# Patient Record
Sex: FEMALE | Race: WHITE | HISPANIC OR LATINO
[De-identification: names, ages, dates, MRNs, and addresses within clinical notes are randomized per-mention and may not be internally consistent; named-entity substitution may affect disease eponyms.]

---

## 2017-01-24 ENCOUNTER — APPOINTMENT (OUTPATIENT)
Dept: DERMATOLOGY | Facility: CLINIC | Age: 47
End: 2017-01-24

## 2018-04-05 ENCOUNTER — APPOINTMENT (OUTPATIENT)
Dept: PULMONOLOGY | Facility: CLINIC | Age: 48
End: 2018-04-05
Payer: COMMERCIAL

## 2018-04-05 VITALS
OXYGEN SATURATION: 98 % | SYSTOLIC BLOOD PRESSURE: 130 MMHG | HEIGHT: 65 IN | HEART RATE: 75 BPM | TEMPERATURE: 98.6 F | BODY MASS INDEX: 38.15 KG/M2 | DIASTOLIC BLOOD PRESSURE: 80 MMHG | RESPIRATION RATE: 18 BRPM | WEIGHT: 229 LBS

## 2018-04-05 DIAGNOSIS — K21.9 GASTRO-ESOPHAGEAL REFLUX DISEASE W/OUT ESOPHAGITIS: ICD-10-CM

## 2018-04-05 DIAGNOSIS — Z87.891 PERSONAL HISTORY OF NICOTINE DEPENDENCE: ICD-10-CM

## 2018-04-05 DIAGNOSIS — R40.0 SOMNOLENCE: ICD-10-CM

## 2018-04-05 DIAGNOSIS — Z80.3 FAMILY HISTORY OF MALIGNANT NEOPLASM OF BREAST: ICD-10-CM

## 2018-04-05 PROCEDURE — 99203 OFFICE O/P NEW LOW 30 MIN: CPT

## 2018-04-19 ENCOUNTER — FORM ENCOUNTER (OUTPATIENT)
Age: 48
End: 2018-04-19

## 2018-04-19 ENCOUNTER — APPOINTMENT (OUTPATIENT)
Dept: OTOLARYNGOLOGY | Facility: CLINIC | Age: 48
End: 2018-04-19
Payer: COMMERCIAL

## 2018-04-19 VITALS
DIASTOLIC BLOOD PRESSURE: 90 MMHG | OXYGEN SATURATION: 99 % | SYSTOLIC BLOOD PRESSURE: 151 MMHG | HEART RATE: 70 BPM | TEMPERATURE: 98.4 F

## 2018-04-19 PROCEDURE — 99204 OFFICE O/P NEW MOD 45 MIN: CPT

## 2018-04-20 RX ORDER — AMLODIPINE BESYLATE 10 MG/1
10 TABLET ORAL
Qty: 90 | Refills: 0 | Status: ACTIVE | COMMUNITY
Start: 2018-04-11

## 2018-04-27 ENCOUNTER — APPOINTMENT (OUTPATIENT)
Dept: OTOLARYNGOLOGY | Facility: CLINIC | Age: 48
End: 2018-04-27
Payer: COMMERCIAL

## 2018-04-27 VITALS
DIASTOLIC BLOOD PRESSURE: 89 MMHG | HEART RATE: 71 BPM | TEMPERATURE: 97 F | OXYGEN SATURATION: 99 % | SYSTOLIC BLOOD PRESSURE: 139 MMHG

## 2018-04-27 DIAGNOSIS — R42 DIZZINESS AND GIDDINESS: ICD-10-CM

## 2018-04-27 DIAGNOSIS — H90.42 SENSORINEURAL HEARING LOSS, UNILATERAL, LEFT EAR, WITH UNRESTRICTED HEARING ON THE CONTRALATERAL SIDE: ICD-10-CM

## 2018-04-27 PROCEDURE — 92567 TYMPANOMETRY: CPT

## 2018-04-27 PROCEDURE — 92537 CALORIC VSTBLR TEST W/REC: CPT

## 2018-04-27 PROCEDURE — 92540 BASIC VESTIBULAR EVALUATION: CPT

## 2018-04-27 PROCEDURE — 92586: CPT

## 2018-04-27 PROCEDURE — 92557 COMPREHENSIVE HEARING TEST: CPT

## 2018-04-27 PROCEDURE — 99213 OFFICE O/P EST LOW 20 MIN: CPT

## 2018-05-17 ENCOUNTER — FORM ENCOUNTER (OUTPATIENT)
Age: 48
End: 2018-05-17

## 2018-05-30 ENCOUNTER — APPOINTMENT (OUTPATIENT)
Dept: OTOLARYNGOLOGY | Facility: CLINIC | Age: 48
End: 2018-05-30

## 2018-09-04 PROBLEM — M25.562 CHRONIC PAIN OF LEFT KNEE: Status: ACTIVE | Noted: 2018-09-04

## 2018-09-05 ENCOUNTER — APPOINTMENT (OUTPATIENT)
Dept: ORTHOPEDIC SURGERY | Facility: CLINIC | Age: 48
End: 2018-09-05

## 2018-09-05 DIAGNOSIS — G89.29 PAIN IN LEFT KNEE: ICD-10-CM

## 2018-09-05 DIAGNOSIS — M25.562 PAIN IN LEFT KNEE: ICD-10-CM

## 2018-09-06 ENCOUNTER — EMERGENCY (EMERGENCY)
Facility: HOSPITAL | Age: 48
LOS: 1 days | Discharge: HOME | End: 2018-09-06
Attending: EMERGENCY MEDICINE | Admitting: STUDENT IN AN ORGANIZED HEALTH CARE EDUCATION/TRAINING PROGRAM
Payer: COMMERCIAL

## 2018-09-06 VITALS
DIASTOLIC BLOOD PRESSURE: 102 MMHG | HEART RATE: 71 BPM | TEMPERATURE: 98 F | RESPIRATION RATE: 18 BRPM | OXYGEN SATURATION: 100 % | SYSTOLIC BLOOD PRESSURE: 188 MMHG

## 2018-09-06 LAB
APTT BLD: 33.2 SEC — SIGNIFICANT CHANGE UP (ref 27.5–37.4)
BASE EXCESS BLDV CALC-SCNC: 3.2 MMOL/L — SIGNIFICANT CHANGE UP
BASOPHILS # BLD AUTO: 0.04 K/UL — SIGNIFICANT CHANGE UP (ref 0–0.2)
BASOPHILS NFR BLD AUTO: 0.4 % — SIGNIFICANT CHANGE UP (ref 0–2)
BLOOD GAS VENOUS - CREATININE: 0.58 MG/DL — SIGNIFICANT CHANGE UP (ref 0.5–1.3)
CHLORIDE BLDV-SCNC: 103 MMOL/L — SIGNIFICANT CHANGE UP (ref 96–108)
EOSINOPHIL # BLD AUTO: 0.19 K/UL — SIGNIFICANT CHANGE UP (ref 0–0.5)
EOSINOPHIL NFR BLD AUTO: 2.1 % — SIGNIFICANT CHANGE UP (ref 0–6)
GAS PNL BLDV: 131 MMOL/L — LOW (ref 136–146)
GLUCOSE BLDV-MCNC: 101 — HIGH (ref 70–99)
HCO3 BLDV-SCNC: 26 MMOL/L — SIGNIFICANT CHANGE UP (ref 20–27)
HCT VFR BLD CALC: 42.9 % — SIGNIFICANT CHANGE UP (ref 34.5–45)
HCT VFR BLDV CALC: 45.1 % — HIGH (ref 34.5–45)
HGB BLD-MCNC: 14.1 G/DL — SIGNIFICANT CHANGE UP (ref 11.5–15.5)
HGB BLDV-MCNC: 14.7 G/DL — SIGNIFICANT CHANGE UP (ref 11.5–15.5)
IMM GRANULOCYTES # BLD AUTO: 0.02 # — SIGNIFICANT CHANGE UP
IMM GRANULOCYTES NFR BLD AUTO: 0.2 % — SIGNIFICANT CHANGE UP (ref 0–1.5)
INR BLD: 1.02 — SIGNIFICANT CHANGE UP (ref 0.88–1.17)
LACTATE BLDV-MCNC: 1.4 MMOL/L — SIGNIFICANT CHANGE UP (ref 0.5–2)
LYMPHOCYTES # BLD AUTO: 2.71 K/UL — SIGNIFICANT CHANGE UP (ref 1–3.3)
LYMPHOCYTES # BLD AUTO: 29.5 % — SIGNIFICANT CHANGE UP (ref 13–44)
MCHC RBC-ENTMCNC: 26.5 PG — LOW (ref 27–34)
MCHC RBC-ENTMCNC: 32.9 % — SIGNIFICANT CHANGE UP (ref 32–36)
MCV RBC AUTO: 80.5 FL — SIGNIFICANT CHANGE UP (ref 80–100)
MONOCYTES # BLD AUTO: 0.88 K/UL — SIGNIFICANT CHANGE UP (ref 0–0.9)
MONOCYTES NFR BLD AUTO: 9.6 % — SIGNIFICANT CHANGE UP (ref 2–14)
NEUTROPHILS # BLD AUTO: 5.34 K/UL — SIGNIFICANT CHANGE UP (ref 1.8–7.4)
NEUTROPHILS NFR BLD AUTO: 58.2 % — SIGNIFICANT CHANGE UP (ref 43–77)
NRBC # FLD: 0 — SIGNIFICANT CHANGE UP
PCO2 BLDV: 46 MMHG — SIGNIFICANT CHANGE UP (ref 41–51)
PH BLDV: 7.4 PH — SIGNIFICANT CHANGE UP (ref 7.32–7.43)
PLATELET # BLD AUTO: 426 K/UL — HIGH (ref 150–400)
PMV BLD: 10.3 FL — SIGNIFICANT CHANGE UP (ref 7–13)
PO2 BLDV: 40 MMHG — SIGNIFICANT CHANGE UP (ref 35–40)
POTASSIUM BLDV-SCNC: 4.8 MMOL/L — HIGH (ref 3.4–4.5)
PROTHROM AB SERPL-ACNC: 11.3 SEC — SIGNIFICANT CHANGE UP (ref 9.8–13.1)
RBC # BLD: 5.33 M/UL — HIGH (ref 3.8–5.2)
RBC # FLD: 13.1 % — SIGNIFICANT CHANGE UP (ref 10.3–14.5)
SAO2 % BLDV: 70.9 % — SIGNIFICANT CHANGE UP (ref 60–85)
WBC # BLD: 9.18 K/UL — SIGNIFICANT CHANGE UP (ref 3.8–10.5)
WBC # FLD AUTO: 9.18 K/UL — SIGNIFICANT CHANGE UP (ref 3.8–10.5)

## 2018-09-06 PROCEDURE — 99291 CRITICAL CARE FIRST HOUR: CPT

## 2018-09-06 RX ORDER — ASPIRIN/CALCIUM CARB/MAGNESIUM 324 MG
81 TABLET ORAL ONCE
Qty: 0 | Refills: 0 | Status: COMPLETED | OUTPATIENT
Start: 2018-09-06 | End: 2018-09-06

## 2018-09-06 NOTE — ED PROVIDER NOTE - ATTENDING CONTRIBUTION TO CARE
Dr. Salgado: I have personally performed a face to face bedside history and physical examination of this patient. I have discussed the history, examination, review of systems, assessment and plan of management with the resident. I have reviewed the electronic medical record and amended it to reflect my history, review of systems, physical exam, assessment and plan.    pt with sudden onset of chest discomfort/palpitations then L arm numbness and L face numbness. no dysarthria, sob, vision changes, headache, vomiting. No prior similar episodes. code stroke activated on arrival as within TPA window    on exam well appearing  RRR s 1 s2  A&Ox3, PERRLA, EOMI, no nystagmus, CN2-12 grossly intact, except decreased sensation to right lower face, no pronator drift, normal finger to nose and rapid alternating finger movements,  5/5 strength in all extremities, normal gait, symmetric patellar reflexes. reports decreased sensation to left arm.    concerning for stroke vs tia, r/o brain mass, electrolyte  abnormality, dysrythmia, ACS. Plan for EKG, CT head, labs.

## 2018-09-06 NOTE — ED PROVIDER NOTE - PHYSICAL EXAMINATION
Gen: No acute distress, alert, cooperative  Head: Normocephalic, Atraumatic  HEENT: PERRL, oral mucosa moist, normal conjunctiva  Lung: CTAB, no respiratory distress, no crackles or wheezes  CV: rrr, no murmur  Abd: soft, NTND, no rebound or guarding  MSK: No LE edema, no visible deformities  Neuro: numbness to left face and left arm, normal strength all extremities, no pronator drift, finger to nose normal, speech clear  Skin: Warm and dry, no evidence of rash   Psych: normal affect, follows commands

## 2018-09-06 NOTE — ED ADULT TRIAGE NOTE - CHIEF COMPLAINT QUOTE
Pt c/o palpitations since 9:30pm while watching TV with left arm and face numbness and tingling, dizziness. Denies any pain/discomfort, SOB. Speech clear, no facial droop noted. +strength, +sensation. FS 93. LYRIC called for stroke eval. Code stroke called. Charge RN notified and aware.

## 2018-09-06 NOTE — ED PROVIDER NOTE - MEDICAL DECISION MAKING DETAILS
49yo with htn presenting with 1.5 hours left sided facial and left arm numbness, no vision changes, no speech changes, no facial droop, no weakness starting at 9:30pm acutely. No other symptoms. Code stroke called. CT completed. labs, neuro recs.

## 2018-09-06 NOTE — ED PROVIDER NOTE - PROGRESS NOTE DETAILS
[place] Stroke eval: 47yo with htn with 1.5 hours left sided facial and left arm numbness, no vision changes, no speech changes, no facial droop, no weakness. On exam + sensory deficit left face and left arm, strength intact, no pronator drift, code stroke called and d/w neuro resident Damian: neuro would like cta head/neck. AK: Neuro talked to hospitalist. Accepted for admission.

## 2018-09-06 NOTE — ED PROVIDER NOTE - OBJECTIVE STATEMENT
47yo with htn presenting with 1.5 hours left sided facial and left arm numbness, no vision changes, no speech changes, no facial droop, no weakness starting at 9:30pm acutely. No other symptoms. Her blood pressure was elevated at home.

## 2018-09-07 ENCOUNTER — TRANSCRIPTION ENCOUNTER (OUTPATIENT)
Age: 48
End: 2018-09-07

## 2018-09-07 VITALS
OXYGEN SATURATION: 100 % | HEART RATE: 69 BPM | RESPIRATION RATE: 17 BRPM | TEMPERATURE: 98 F | DIASTOLIC BLOOD PRESSURE: 82 MMHG | SYSTOLIC BLOOD PRESSURE: 127 MMHG

## 2018-09-07 DIAGNOSIS — R29.818 OTHER SYMPTOMS AND SIGNS INVOLVING THE NERVOUS SYSTEM: ICD-10-CM

## 2018-09-07 DIAGNOSIS — Z98.890 OTHER SPECIFIED POSTPROCEDURAL STATES: Chronic | ICD-10-CM

## 2018-09-07 DIAGNOSIS — Z29.9 ENCOUNTER FOR PROPHYLACTIC MEASURES, UNSPECIFIED: ICD-10-CM

## 2018-09-07 DIAGNOSIS — I10 ESSENTIAL (PRIMARY) HYPERTENSION: ICD-10-CM

## 2018-09-07 DIAGNOSIS — I63.9 CEREBRAL INFARCTION, UNSPECIFIED: ICD-10-CM

## 2018-09-07 LAB
ALBUMIN SERPL ELPH-MCNC: 4.3 G/DL — SIGNIFICANT CHANGE UP (ref 3.3–5)
ALP SERPL-CCNC: 68 U/L — SIGNIFICANT CHANGE UP (ref 40–120)
ALT FLD-CCNC: 14 U/L — SIGNIFICANT CHANGE UP (ref 4–33)
AST SERPL-CCNC: 20 U/L — SIGNIFICANT CHANGE UP (ref 4–32)
BILIRUB SERPL-MCNC: 0.3 MG/DL — SIGNIFICANT CHANGE UP (ref 0.2–1.2)
BUN SERPL-MCNC: 12 MG/DL — SIGNIFICANT CHANGE UP (ref 7–23)
BUN SERPL-MCNC: 8 MG/DL — SIGNIFICANT CHANGE UP (ref 7–23)
CALCIUM SERPL-MCNC: 9 MG/DL — SIGNIFICANT CHANGE UP (ref 8.4–10.5)
CALCIUM SERPL-MCNC: 9.6 MG/DL — SIGNIFICANT CHANGE UP (ref 8.4–10.5)
CHLORIDE SERPL-SCNC: 100 MMOL/L — SIGNIFICANT CHANGE UP (ref 98–107)
CHLORIDE SERPL-SCNC: 97 MMOL/L — LOW (ref 98–107)
CHOLEST SERPL-MCNC: 123 MG/DL — SIGNIFICANT CHANGE UP (ref 120–199)
CO2 SERPL-SCNC: 23 MMOL/L — SIGNIFICANT CHANGE UP (ref 22–31)
CO2 SERPL-SCNC: 23 MMOL/L — SIGNIFICANT CHANGE UP (ref 22–31)
CREAT SERPL-MCNC: 0.7 MG/DL — SIGNIFICANT CHANGE UP (ref 0.5–1.3)
CREAT SERPL-MCNC: 0.74 MG/DL — SIGNIFICANT CHANGE UP (ref 0.5–1.3)
GLUCOSE SERPL-MCNC: 92 MG/DL — SIGNIFICANT CHANGE UP (ref 70–99)
GLUCOSE SERPL-MCNC: 97 MG/DL — SIGNIFICANT CHANGE UP (ref 70–99)
HBA1C BLD-MCNC: 5.1 % — SIGNIFICANT CHANGE UP (ref 4–5.6)
HCT VFR BLD CALC: 39 % — SIGNIFICANT CHANGE UP (ref 34.5–45)
HDLC SERPL-MCNC: 55 MG/DL — SIGNIFICANT CHANGE UP (ref 45–65)
HGB BLD-MCNC: 12.9 G/DL — SIGNIFICANT CHANGE UP (ref 11.5–15.5)
LIPID PNL WITH DIRECT LDL SERPL: 64 MG/DL — SIGNIFICANT CHANGE UP
MAGNESIUM SERPL-MCNC: 1.9 MG/DL — SIGNIFICANT CHANGE UP (ref 1.6–2.6)
MCHC RBC-ENTMCNC: 27 PG — SIGNIFICANT CHANGE UP (ref 27–34)
MCHC RBC-ENTMCNC: 33.1 % — SIGNIFICANT CHANGE UP (ref 32–36)
MCV RBC AUTO: 81.6 FL — SIGNIFICANT CHANGE UP (ref 80–100)
NRBC # FLD: 0 — SIGNIFICANT CHANGE UP
PHOSPHATE SERPL-MCNC: 3 MG/DL — SIGNIFICANT CHANGE UP (ref 2.5–4.5)
PLATELET # BLD AUTO: 368 K/UL — SIGNIFICANT CHANGE UP (ref 150–400)
PMV BLD: 10.2 FL — SIGNIFICANT CHANGE UP (ref 7–13)
POTASSIUM SERPL-MCNC: 3.8 MMOL/L — SIGNIFICANT CHANGE UP (ref 3.5–5.3)
POTASSIUM SERPL-MCNC: 4.2 MMOL/L — SIGNIFICANT CHANGE UP (ref 3.5–5.3)
POTASSIUM SERPL-SCNC: 3.8 MMOL/L — SIGNIFICANT CHANGE UP (ref 3.5–5.3)
POTASSIUM SERPL-SCNC: 4.2 MMOL/L — SIGNIFICANT CHANGE UP (ref 3.5–5.3)
PROT SERPL-MCNC: 8.6 G/DL — HIGH (ref 6–8.3)
RBC # BLD: 4.78 M/UL — SIGNIFICANT CHANGE UP (ref 3.8–5.2)
RBC # FLD: 13.1 % — SIGNIFICANT CHANGE UP (ref 10.3–14.5)
SODIUM SERPL-SCNC: 137 MMOL/L — SIGNIFICANT CHANGE UP (ref 135–145)
SODIUM SERPL-SCNC: 138 MMOL/L — SIGNIFICANT CHANGE UP (ref 135–145)
TRIGL SERPL-MCNC: 48 MG/DL — SIGNIFICANT CHANGE UP (ref 10–149)
TROPONIN T, HIGH SENSITIVITY: < 6 NG/L — SIGNIFICANT CHANGE UP (ref ?–14)
TSH SERPL-MCNC: 3.12 UIU/ML — SIGNIFICANT CHANGE UP (ref 0.27–4.2)
WBC # BLD: 7.83 K/UL — SIGNIFICANT CHANGE UP (ref 3.8–10.5)
WBC # FLD AUTO: 7.83 K/UL — SIGNIFICANT CHANGE UP (ref 3.8–10.5)

## 2018-09-07 RX ORDER — HEPARIN SODIUM 5000 [USP'U]/ML
5000 INJECTION INTRAVENOUS; SUBCUTANEOUS EVERY 12 HOURS
Qty: 0 | Refills: 0 | Status: DISCONTINUED | OUTPATIENT
Start: 2018-09-07 | End: 2018-09-07

## 2018-09-07 RX ORDER — AMLODIPINE BESYLATE 2.5 MG/1
1 TABLET ORAL
Qty: 7 | Refills: 0 | OUTPATIENT
Start: 2018-09-07 | End: 2018-09-13

## 2018-09-07 RX ORDER — ASPIRIN/CALCIUM CARB/MAGNESIUM 324 MG
81 TABLET ORAL DAILY
Qty: 0 | Refills: 0 | Status: DISCONTINUED | OUTPATIENT
Start: 2018-09-07 | End: 2018-09-07

## 2018-09-07 RX ORDER — PANTOPRAZOLE SODIUM 20 MG/1
40 TABLET, DELAYED RELEASE ORAL AT BEDTIME
Qty: 0 | Refills: 0 | Status: DISCONTINUED | OUTPATIENT
Start: 2018-09-07 | End: 2018-09-07

## 2018-09-07 RX ORDER — ASPIRIN/CALCIUM CARB/MAGNESIUM 324 MG
1 TABLET ORAL
Qty: 30 | Refills: 0 | OUTPATIENT
Start: 2018-09-07 | End: 2018-10-06

## 2018-09-07 RX ORDER — ATORVASTATIN CALCIUM 80 MG/1
80 TABLET, FILM COATED ORAL AT BEDTIME
Qty: 0 | Refills: 0 | Status: DISCONTINUED | OUTPATIENT
Start: 2018-09-07 | End: 2018-09-07

## 2018-09-07 RX ORDER — INFLUENZA VIRUS VACCINE 15; 15; 15; 15 UG/.5ML; UG/.5ML; UG/.5ML; UG/.5ML
0.5 SUSPENSION INTRAMUSCULAR ONCE
Qty: 0 | Refills: 0 | Status: DISCONTINUED | OUTPATIENT
Start: 2018-09-07 | End: 2018-09-07

## 2018-09-07 RX ADMIN — HEPARIN SODIUM 5000 UNIT(S): 5000 INJECTION INTRAVENOUS; SUBCUTANEOUS at 07:27

## 2018-09-07 RX ADMIN — Medication 81 MILLIGRAM(S): at 11:13

## 2018-09-07 RX ADMIN — Medication 81 MILLIGRAM(S): at 00:52

## 2018-09-07 NOTE — PROGRESS NOTE ADULT - ASSESSMENT
46 y/o F with h/o HTN, legally blind in right eye, scoliosis presents to the ED for left facial and left arm numbness and tingling admitted to telemetry to r/o possible stroke. Presenting symptoms now resolved, exam benign, blood work WNL, and MRI negative for acute stroke.

## 2018-09-07 NOTE — DISCHARGE NOTE ADULT - HOSPITAL COURSE
49 y/o F with h/o HTN, legally blind in right eye, scoliosis presents to the ED for left faci and left arm numbness and tingling. Admit to telemetry to r/o CVA. 47 y/o F with h/o HTN, legally blind in right eye, scoliosis presents to the ED for left faci and left arm numbness and tingling. Admit to telemetry to r/o CVA.     Neurology F/U, TELE  monitor, CT Angio Head & Neck pending Results, MRI Head no contrast- neg for acute bleed.

## 2018-09-07 NOTE — ED ADULT NURSE NOTE - OBJECTIVE STATEMENT
[place] Nurse Note- pt presents to ED as a code stroke. Pt developed left sided paresthesias at 2130 hrs while at rest, watching tv. no syncope. Hx of htn. Compliant with amlodipine 5mg. symmetrical smile. good  strength bilat. no arm drift. bilat radial pulses present. Clear speech. Pt is A&Ox4. rsr on monitor, no ectopy. respirations even and unlabored. Pt denies pain at this time. iv placed, labs drawn and sent. 18 ga right ac. Pt endorsed to primary RN Shannon

## 2018-09-07 NOTE — DISCHARGE NOTE ADULT - ADDITIONAL INSTRUCTIONS
Return to the Hospital Emergency Department for any worsening symptoms or concerns, headache, nausea or vomiting, blurry vision, changes in mental status, extremities weakness, numbness, tingling, or any difficulties at all.  Please follow up with your own private physician or our medical clinic at [phone] with in one week.   Find a doctor at [phone].  Thank you for allowing us to take part in your care. You can start taking a baby Aspirin once daily and you need to follow up with your doctor to schedule an outpatient Echo.  Return to the Hospital Emergency Department for any worsening symptoms or concerns, headache, nausea or vomiting, blurry vision, changes in mental status, extremities weakness, numbness, tingling, or any difficulties at all.  Please follow up with your own private physician or our medical clinic at [phone] with in one week.   Find a doctor at [phone].  Thank you for allowing us to take part in your care.

## 2018-09-07 NOTE — ED ADULT NURSE REASSESSMENT NOTE - NS ED NURSE REASSESS COMMENT FT1
No acute distress at present. Respirations are even and unlabored on room air. Pt. is admitted to Tele, awaiting bed assignment. Report given to ESSU 1 PARDEEP Gillis. Pt. is at CT at present and will be transported to ESSU 1 upon CT completion.

## 2018-09-07 NOTE — PROGRESS NOTE ADULT - PROBLEM SELECTOR PLAN 1
Stroke r/o by MRI. CTA of head and neck revealed patent vessels. Lipid panel normal. Low suspicion for TIA at this time. Pt only with HTN but reports BP is usually well controlled on home regimen of amlodipine. Etiology of presenting symptoms still unknown. Symptoms now resolved spontaneously  TSH normal. HbA1c normal.  - f/u neurology to comment on MRI findings  - d/c atorvastatin

## 2018-09-07 NOTE — SWALLOW BEDSIDE ASSESSMENT ADULT - ORAL PHASE
Within functional limits Lingual stasis/Lingual stasis noted and independently cleared with a liquid wash.

## 2018-09-07 NOTE — CONSULT NOTE ADULT - SUBJECTIVE AND OBJECTIVE BOX
JASMINE Schuler is a 47y old  Female who presents with a chief complaint of left sided tingling    HPI:  Pt is a 46 y/o Left handed woman with hx of HTN p/w sudden onset of palpitations and left face and arm tingling and numbness at 9:30 PM while watching TV. Pt has never experienced these symptoms before. She denies any headache, visual changes, speech disturbance, weakness or difficulty ambulating. She endorses that she tingling and sensory disturbance is still going on. At the time onset her blood pressure was elevated to the 180s which is well beyond her baseline of the 130s. Does not take aspirin.       MEDICATIONS  (STANDING):  aspirin  chewable 81 milliGRAM(s) Oral once    MEDICATIONS  (PRN): amlodipine    PAST MEDICAL & SURGICAL HISTORY: HTN , blind right eye    FAMILY HISTORY:    Allergies    No Known Allergies    Intolerances        SHx - former smoker 26yrs ago      REVIEW OF SYSTEMS:    Constitutional: No fever, weight loss, or fatigue  Eyes: No eye pain, visual disturbances, or discharge  ENMT:  No difficulty hearing, tinnitus, vertigo; No sinus or throat pain  Neck: No pain or stiffness  Respiratory: No cough, wheezing, or shortness of breath  Cardiovascular: No chest pain, palpitations, or leg swelling  Gastrointestinal: No abdominal pain, nausea, vomiting, diarrhea or constipation.   Genitourinary: No dysuria, frequency, hematuria, or incontinence  Neurological: As per HPI  Skin: No itching, burning, rashes, or lesions   Endocrine: No heat or cold intolerance; No hair loss  Musculoskeletal: No joint pain or swelling; No muscle, back, or extremity pain  Psychiatric: No depression, anxiety, mood swings, or difficulty sleeping  Heme/Lymph: No easy bruising or bleeding; No enlarged glands      Vital Signs Last 24 Hrs  T(C): 36.8 (06 Sep 2018 22:55), Max: 36.8 (06 Sep 2018 22:55)  T(F): 98.3 (06 Sep 2018 22:55), Max: 98.3 (06 Sep 2018 22:55)  HR: 71 (06 Sep 2018 22:55) (71 - 71)  BP: 188/102 (06 Sep 2018 22:55) (188/102 - 188/102)  BP(mean): --  RR: 18 (06 Sep 2018 22:55) (18 - 18)  SpO2: 100% (06 Sep 2018 22:55) (100% - 100%)    General Exam:   General appearance: No acute distress    Cardiac:  Pulm:                 Neurological Exam:  Mental Status: Orientated to self, date and place.  Attention intact.  No dysarthria. Speech fluent.  Cranial Nerves:   Right pupil enlarged unreactive (congenital) left 2-3mm reactive, EOMI, left visual field intact, decreased right visual field, no nystagmus.   subjective decrease to LT on left side of face 60% of normal.  No facial asymmetry.  Hearing intact bilaterally.  Tongue  midline.  Sternocleidomastoid and Trapezius intact bilaterally.    Motor:   Tone: normal.                  Strength:     [] Upper extremity                      Delt       Bicep    Tricep                                                  R         5/5        5/5        5/5       5/5                                               L          5/5        5/5        5/5       5/5  [] Lower extremity                       HF          KE          KF        DF         PF                                               R        5/5        5/5        5/5       5/5       5/5                                               L         5/5        5/5       5/5       5/5        5/5             Dysmetria: None to finger-nose-finger or heel-shin-heel  No truncal ataxia.    Tremor: No resting, postural or action tremor.  No myoclonus.    Sensation: decreased to LT on left shoulder, upper chest and arm, normal to LT on legs    Gait: normal.      Other:                          14.1   9.18  )-----------( 426      ( 06 Sep 2018 23:30 )             42.9       Radiology    CT: < from: CT Brain Stroke Protocol (09.06.18 @ 23:15) >  FINDINGS:     There is no acute intra-axial or extra-axial hemorrhage. There is no mass   effect or shift of the midline. The ventricles are not dilated. There is   symmetric prominence of the bifrontal extra-axial spaces and enlargement   of the superior cerebellar cistern.. There is no CT evidence of an acute   vascular territorial infarct.
HISTORY OF PRESENT ILLNESS: HPI:  46 y/o F with h/o HTN, legally blind in the right eye, scoliosis presents to the Ed for palpitations, left face and arm tingling and numbness at 9:30 while watching tv. Pt states her symptoms have improved since arrival to the ED. Pt states she never had this before. Pt took her blood pressure at the time of her symptoms onset and he BP was systolic 180s. Pt denies LOC, fever, chills, slurred speech, facial droop, dizziness, chest pain, shortness of breath, headache, visual/hearing changes, weakness, dysuria, urinary/bowel incontinence or any other complaints at this time.     family HX: noncontributory to current pt's medical condition, (07 Sep 2018 04:06)      PAST MEDICAL & SURGICAL HISTORY:  Scoliosis  Legally blind in right eye, as defined in [place]  Hypertension  History of lumpectomy of left breast        MEDICATIONS  (STANDING):  aspirin  chewable 81 milliGRAM(s) Oral daily  atorvastatin 80 milliGRAM(s) Oral at bedtime  heparin  Injectable 5000 Unit(s) SubCutaneous every 12 hours  influenza   Vaccine 0.5 milliLiter(s) IntraMuscular once  pantoprazole    Tablet 40 milliGRAM(s) Oral at bedtime      Allergies    No Known Allergies    Intolerances        FAMILY HISTORY:  Family history of diabetes mellitus in grandfather (Grandparent)    Non-contributary for premature coronary disease or sudden cardiac death    SOCIAL HISTORY:    [ ] Non-smoker  [ ] Smoker  [ ] Alcohol      REVIEW OF SYSTEMS:  [ ]chest pain  [  ]shortness of breath  [  ]palpitations  [  ]syncope  [ ]near syncope [ ]upper extremity weakness   [ ] lower extremity weakness  [  ]diplopia  [  ]altered mental status   [  ]fevers  [ ]chills [ ]nausea  [ ]vomitting  [  ]dysphagia    [ ]abdominal pain  [ ]melena  [ ]BRBPR    [  ]epistaxis  [  ]rash    [ ]lower extremity edema        [ ] All others negative	  [ ] Unable to obtain    PHYSICAL EXAM:  T(C): 36.6 (09-07-18 @ 10:04), Max: 36.8 (09-06-18 @ 22:55)  HR: 69 (09-07-18 @ 10:04) (56 - 71)  BP: 127/82 (09-07-18 @ 10:04) (114/72 - 188/102)  RR: 17 (09-07-18 @ 10:04) (16 - 18)  SpO2: 100% (09-07-18 @ 10:04) (98% - 100%)  Wt(kg): --    Appearance: Normal	  HEENT:   Normal oral mucosa, PERRL, EOMI	  Lymphatic: No lymphadenopathy , no edema  Cardiovascular: Normal S1 S2, No JVD, No murmurs , Peripheral pulses palpable 2+ bilaterally  Respiratory: Lungs clear to auscultation, normal effort 	  Gastrointestinal:  Soft, Non-tender, + BS	  Skin: No rashes, No ecchymoses, No cyanosis, warm to touch  Musculoskeletal: Normal range of motion, normal strength  Psychiatry:  Mood & affect appropriate      TELEMETRY: 	NSR    ECG:  	NSR narrow QRS     Echo: 2016 normal LV function no valve dx  NST: stress echo 2015  Cath: n/a  	  	  LABS:	 	                          12.9   7.83  )-----------( 368      ( 07 Sep 2018 06:05 )             39.0     09-07    138  |  100  |  8   ----------------------------<  92  3.8   |  23  |  0.70    Ca    9.0      07 Sep 2018 06:05  Phos  3.0     09-07  Mg     1.9     09-07    TPro  8.6<H>  /  Alb  4.3  /  TBili  0.3  /  DBili  x   /  AST  20  /  ALT  14  /  AlkPhos  68  09-06      HgA1c: Hemoglobin A1C, Whole Blood: 5.1 % (09-07 @ 06:05)    TSH: Thyroid Stimulating Hormone, Serum: 3.12 uIU/mL (09-07 @ 06:05)     CT Brain Stroke Protocol (09.06.18 @ 23:15) >    IMPRESSION:     No acute intracranial hemorrhage, mass effect, or CT evidence of an acute   vascular territorial infarct.    < end of copied text >      CT Angio Neck w/ IV Cont (09.07.18 @ 02:27) >  IMPRESSION:  Suboptimal evaluation of the carotid bifurcations due to   artifact.    No significant stenosis or vascular occlusion identified.            < from: MR Head No Cont (09.07.18 @ 08:47) >    IMPRESSION:    No evidence of acute cerebral ischemia.    Multiple foci of white matter T2 hyperintensity which are nonspecific in   appearance. While these may be associated with migraine headaches or   microvascular disease in the appropriate clinical setting, other   possibilities including inflammatory/post inflammatory (e.g. Lyme   disease) or demyelinating etiologies should be considered.     Prominent extra-axial CSF along the frontoparietal convexities near the   vertex with mild flattening of the underlying brain. This may represent   prominent fluid within the subarachnoid space or bilateral arachnoid   cysts.    Small arachnoid cysts within the middle cranial fossa bilaterally.    Elongation of the right globe as discussed above.    < end of copied text >      ASSESSMENT/PLAN:  47 year old female well known to our practice with HTN and obesity, legally blind in her Right eye, with historically normal LV function on TTE 2016 with no ischemia on stres echo in 2015 admitted with complaints of left face and arm tingling associated with palpitations. Her symptoms began while she was watching TV and is not associated with any anginal symptoms. Her EKG on admit revealed NSR narrow QRS 68bpm and tele monitoring revealed NSR 60s.  Currently she is asymptomatic. Brain imaging reviewed with no evidence CVA.     -- troponin negative  -- EKG unremarkable  -- tele unremarkable  -- TSH WNL  -- prior cardiac testing all WNL  -- no evidence CVA on neuro imaging - consider neuro eval given nonspecific MRI findings  -- given no evidence CVA, no need for further cardiac testing at this time  -- follow up with Dr Srivastava in our office for cardio upon DC

## 2018-09-07 NOTE — DISCHARGE NOTE ADULT - PLAN OF CARE
Improve neurological function Stroke Activation- Neg imaging Follow up with your primary care doctor. Ambulatory- Stroke Activation- MRI neg for acute hemorrhage.

## 2018-09-07 NOTE — H&P ADULT - ATTENDING COMMENTS
48 y/o female HX of HTN, Rt Eye decreased vision ( Congenital ), Scoliosis, admitted to TELE for Left ARM/ Left Face Tingling sensation while watching TV at home, Elevated BP at home, Symptoms improved in the ER, No Slurred speech, no dysphagia, NO CP, NO SOB, no Fever, No N/V, No HA, EX Smoker,  CT Head Unremarkable,    Vitals: Tem 98.1, HR 56, /81, 100% RA,     Neurology F/U, TELE  monitor, CT Angio Head & Neck pending Results, MRI Head no contrast, ECG, Started on ASA, DVT Prophylaxis: SQ Heparin, Start Lipitor, on Protonix, F/U CBC, CMP, HgbA1c, Fasting Lipid, TSH,  ECHOCARDIOGRAM, fall/seizure/aspiration precaution, Permissive HTN x 24 hours as per Neuro, PT/OT consults, Speech & Swallow consult,   Case D/W Pt, TELE PA,    Pt was seen by me, Dr. LUCILLE Harper

## 2018-09-07 NOTE — H&P ADULT - NSHPREVIEWOFSYSTEMS_GEN_ALL_CORE
Constitutional: No fever, fatigue or weight loss.  Skin: No rash.  Eyes: No recent vision problems or eye pain.  ENT: No congestion, ear pain, or sore throat.  Endocrine: No thyroid problems.  Cardiovascular: No chest pain. + palpitations.   Respiratory: No cough, shortness of breath, congestion, or wheezing.  Gastrointestinal: No abdominal pain, nausea, vomiting, or diarrhea.  Genitourinary: No dysuria.  Musculoskeletal: No joint swelling.  Neurologic: No headache. + numbness. + tingling.

## 2018-09-07 NOTE — SWALLOW BEDSIDE ASSESSMENT ADULT - COMMENTS
Per chart review, patient is a "49 y/o Female, with a PmHx of HTN, legally blind in right eye, scoliosis, presents to the ED for left facial and left arm numbness and tingling. Admit to telemetry to r/o CVA." CXR revealed "clear lungs". Head CT indicated "No acute intracranial hemorrhage, mass effect, or CT evidence of an acute vascular territorial infarct."    The patient was seen for an initial bedside swallow evaluation this am, at which time she was alert and cooperative. The patient was received seated upright in bed. Upon questioning, the patient reported a new onset of residue in posterior oral cavity. Per chart review, patient is a "47 y/o Female, with a PmHx of HTN, legally blind in right eye, scoliosis, presents to the ED for left facial and left arm numbness and tingling. Admit to telemetry to r/o CVA." CXR revealed "clear lungs". Head CT indicated "No acute intracranial hemorrhage, mass effect, or CT evidence of an acute vascular territorial infarct."    The patient was seen for an initial bedside swallow evaluation this am, at which time she was alert and cooperative. The patient was received seated upright in bed. Upon questioning, the patient reported "things get stuck" in oral cavity.

## 2018-09-07 NOTE — SWALLOW BEDSIDE ASSESSMENT ADULT - SWALLOW EVAL: DIAGNOSIS
1. The patient demonstrated a functional oral stage for puree, mechanical soft, honey thick, nectar thick, and thin liquid textures marked by adequate acceptance, bolus formation, A-P transport, and clearance. 2. The patient demonstrated a mild oral dysphagia for solids marked by prolonged mastication with lingual residue noted and cleared with a liquid wash. 3. The patient demonstrated a functional pharyngeal stage for puree, mechanical soft, solids, honey thick, nectar thick, and thin liquid textures marked by adequate swallow trigger and adequate hyolaryngeal elevation upon digital palpation. No overt s/s of airway penetration/aspiration noted across all trialed consistencies.

## 2018-09-07 NOTE — DISCHARGE NOTE ADULT - CARE PROVIDER_API CALL
Urbano Bonilla (MD), Medicine  [address] [place] [address]  Phone: [phone]  Fax: [phone]

## 2018-09-07 NOTE — H&P ADULT - FAMILY HISTORY
Grandparent  Still living? Unknown  Family history of diabetes mellitus in grandfather, Age at diagnosis: Age Unknown

## 2018-09-07 NOTE — SWALLOW BEDSIDE ASSESSMENT ADULT - SWALLOW EVAL: RECOMMENDED FEEDING/EATING TECHNIQUES
small sips/bites/maintain upright posture during/after eating for 30 mins/oral hygiene/check mouth frequently for oral residue/pocketing/crush medication (when feasible)/no straws/position upright (90 degrees)/alternate food with liquid

## 2018-09-07 NOTE — H&P ADULT - NSHPLABSRESULTS_GEN_ALL_CORE
LABS:                        14.1   9.18  )-----------( 426      ( 06 Sep 2018 23:30 )             42.9     09-06    137  |  97<L>  |  12  ----------------------------<  97  4.2   |  23  |  0.74    Ca    9.6      06 Sep 2018 23:30    TPro  8.6<H>  /  Alb  4.3  /  TBili  0.3  /  DBili  x   /  AST  20  /  ALT  14  /  AlkPhos  68  09-06    PT/INR - ( 06 Sep 2018 23:30 )   PT: 11.3 SEC;   INR: 1.02          PTT - ( 06 Sep 2018 23:30 )  PTT:33.2 SEC    CAPILLARY BLOOD GLUCOSE      POCT Blood Glucose.: 93 mg/dL (06 Sep 2018 22:59

## 2018-09-07 NOTE — ED ADULT NURSE NOTE - NSIMPLEMENTINTERV_GEN_ALL_ED
Implemented All Universal Safety Interventions:  Walls to call system. Call bell, personal items and telephone within reach. Instruct patient to call for assistance. Room bathroom lighting operational. Non-slip footwear when patient is off stretcher. Physically safe environment: no spills, clutter or unnecessary equipment. Stretcher in lowest position, wheels locked, appropriate side rails in place.

## 2018-09-07 NOTE — PHYSICAL THERAPY INITIAL EVALUATION ADULT - GENERAL OBSERVATIONS, REHAB EVAL
Pt. received on a stretcher in the ESSU in [facility] and offers no new c/o at rest.  Daughter at bedside.

## 2018-09-07 NOTE — H&P ADULT - PROBLEM SELECTOR PLAN 1
Admit to telemetry.   Neurology consult appreciated.   F/U CTA head and neck results.   MR brain ordered.   Permissive HTN up to 220/105 X 24 hours.   Neuro check every 4 hours.  NPO until dysphagia screen is performed.   start aspirin 81 mg QD and lipitor 80 mg at bedtime.   check cbc,tsh,lipid, hemoglobin a1c, bmp with mag and phos.  f/u MD note

## 2018-09-07 NOTE — H&P ADULT - ASSESSMENT
49 y/o F with h/o HTN, legally blind in right eye, scoliosis presents to the ED for left faci and left arm numbness and tingling. Admit to telemetry to r/o CVA.

## 2018-09-07 NOTE — CONSULT NOTE ADULT - ASSESSMENT
48 y/o Left-handed woman with hx of HTN p/w palpitations and left face and arm tingling and numbness. On exam has subjective sensory deficits, no other acute neurological deficits. NIHSS 1, MRS 0. TPA not given as deficits too mild and  benefits would not outweigh the risks. 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 Other ddx would include migraine, focal seizure, electrolyte abnormality, anxiety , although these are less likely.     Plan:    - CTA head and neck  - MRI head w/o con  - start Aspirin 81mg, first dose now  - start Lipitor 80mg  - telemetry monitoring  - neuro checks q 4hrs  - liberalize blood control to 220/105, then gradual normotension 48 y/o Left-handed woman with hx of HTN p/w palpitations and left face and arm tingling and numbness. On exam has subjective sensory deficits, no other acute neurological deficits. NIHSS 1, MRS 0. TPA not given as deficits too mild and  benefits would not outweigh the risks. 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 Other ddx would include migraine, focal seizure, electrolyte abnormality, anxiety , although these are less likely.     Plan:    - CTA head and neck  - MRI head w/o con  - start Aspirin 81mg, first dose now  - start Lipitor 80mg  - telemetry monitoring  - neuro checks q 4hrs  - liberalize blood control to 220/105, then gradual normotension  - case d/w Dr. Libman

## 2018-09-07 NOTE — DISCHARGE NOTE ADULT - CONDITION (STATED IN TERMS THAT PERMIT A SPECIFIC MEASURABLE COMPARISON WITH CONDITION ON ADMISSION):
Stable- Clear for discharge as per Dr. Dowd- Pt agrees with plan for discharge and received copies of result. Stable- Clear for discharge as per Dr. Dowd- Pt agrees with plan for discharge and received copies of result.  MRI report discussed with Dr. Rubio from neurology.  Pt is cleared for d/c from neurology prospective.

## 2018-09-07 NOTE — PHYSICAL THERAPY INITIAL EVALUATION ADULT - PREDICTED DURATION OF THERAPY (DAYS/WKS), PT EVAL
Pt. will not be placed on PT program at this time secondary to independently amb. 200' without assistive device without loss-of-balance.

## 2018-09-07 NOTE — DISCHARGE NOTE ADULT - MEDICATION SUMMARY - MEDICATIONS TO TAKE
I will START or STAY ON the medications listed below when I get home from the hospital:    amLODIPine 5 mg oral tablet  -- 1 tab(s) by mouth once a day  -- Indication: For Hypertension    omeprazole 20 mg oral delayed release tablet  -- 1 tab(s) by mouth once a day  -- Indication: For Reflux

## 2018-09-07 NOTE — H&P ADULT - NSHPPHYSICALEXAM_GEN_ALL_CORE
GENERAL APPEARANCE: Well developed, well nourished, alert and cooperative, and appears to be in no acute distress.  HEAD: normocephalic.  EARS: External auditory canals clear, hearing grossly intact.  NECK: Neck supple, non-tender without lymphadenopathy, masses or thyromegaly.  CARDIAC: Normal S1 and S2. No S3, S4 or murmurs. Rhythm is regular.   LUNGS: Clear to auscultation without rales, rhonchi, wheezing or diminished breath sounds.  ABDOMEN: Positive bowel sounds. Soft, nondistended, nontender. No guarding or rebound. No masses.  EXTREMITIES: No significant deformity or joint abnormality. No edema. Peripheral pulses intact.   NEUROLOGICAL: Mental Status: Orientated to self, date and place.  Attention intact.  No dysarthria. Speech fluent.  Cranial Nerves:   Right pupil enlarged unreactive (congenital) left 2-3mm reactive, EOMI, left visual field intact, decreased right visual field, no nystagmus.   subjective decrease to LT on left side of face 80% of normal.  No facial asymmetry.  Hearing intact bilaterally.  Tongue midline.  Sternocleidomastoid and Trapezius intact bilaterally.  Motor: Tone: normal.                  Strength: intact in all extremities 5/5   Dysmetria: None to finger-nose-finger or heel-shin-heelNo truncal ataxia.    Sensation: decreased to LT on left shoulder, upper chest and arm, normal to LT on legs  SKIN: Skin normal color, texture and turgor with no lesions or eruptions.

## 2018-09-07 NOTE — PROGRESS NOTE ADULT - SUBJECTIVE AND OBJECTIVE BOX
Patient is a 47y old  Female who presents with a chief complaint of palpitations, left face and arm tingling and numbness (07 Sep 2018 11:22)    SUBJECTIVE / OVERNIGHT EVENTS:  Patient seen and examined this morning. Patient's daughter present at bedside. Patient states symptoms of numbness and tingling has resolved. Patient comments though when she lays flight she feels a chest pressure. She has had extensive cardiac w/u as an outpatient, including cardiac stress testing, which was normal. She reports no recent travel or URI symptoms. Patient has increased exercise level and has started [employer] to try to lose weight. She currently has no SOB, chest pain, LE swelling or focal weakness.    MEDICATIONS  (STANDING):  aspirin  chewable 81 milliGRAM(s) Oral daily  atorvastatin 80 milliGRAM(s) Oral at bedtime  heparin  Injectable 5000 Unit(s) SubCutaneous every 12 hours  influenza   Vaccine 0.5 milliLiter(s) IntraMuscular once  pantoprazole    Tablet 40 milliGRAM(s) Oral at bedtime      Vital Signs Last 24 Hrs  T(C): 36.6 (07 Sep 2018 10:04), Max: 36.8 (06 Sep 2018 22:55)  T(F): 97.8 (07 Sep 2018 10:04), Max: 98.3 (06 Sep 2018 22:55)  HR: 69 (07 Sep 2018 10:04) (56 - 71)  BP: 127/82 (07 Sep 2018 10:04) (114/72 - 188/102)  BP(mean): --  RR: 17 (07 Sep 2018 10:04) (16 - 18)  SpO2: 100% (07 Sep 2018 10:04) (98% - 100%)  CAPILLARY BLOOD GLUCOSE  93 (07 Sep 2018 07:04)      POCT Blood Glucose.: 93 mg/dL (06 Sep 2018 22:59)    I&O's Summary        PHYSICAL EXAM  GENERAL: Obese, NAD, well-developed  EYES: EOMI, 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); left eye with round pupil reactive to light, normal conjunctiva and sclera clear  NECK: Supple, No JVD  CHEST/LUNG: Clear to auscultation bilaterally; No wheeze  HEART: Regular rate and rhythm; No murmurs, rubs, or gallops  ABDOMEN: Soft, Nontender, Nondistended; Bowel sounds present  EXTREMITIES:  2+ Peripheral Pulses, No clubbing, cyanosis, or edema  PSYCH: AAOx3  NEURO: Cranial nerves intact; gross motor and sensation to light touch symmetric and intact  SKIN: No rashes or lesions      LABS:                        12.9   7.83  )-----------( 368      ( 07 Sep 2018 06:05 )             39.0     09-07    138  |  100  |  8   ----------------------------<  92  3.8   |  23  |  0.70    Ca    9.0      07 Sep 2018 06:05  Phos  3.0     09-07  Mg     1.9     09-07    TPro  8.6<H>  /  Alb  4.3  /  TBili  0.3  /  DBili  x   /  AST  20  /  ALT  14  /  AlkPhos  68  09-06    PT/INR - ( 06 Sep 2018 23:30 )   PT: 11.3 SEC;   INR: 1.02          PTT - ( 06 Sep 2018 23:30 )  PTT:33.2 SEC      RADIOLOGY & ADDITIONAL TESTS:      09/07/2018:  MR BRAIN    FINDINGS:    VENTRICLES AND SULCI:  Normal.    INTRA-AXIAL:  Small scattered foci of white matter T2 hyperintensity are   seen which are nonspecific in appearance and likely microvascular in   nature. No diffusion restriction is seen to suggest acute ischemia.    EXTRA-AXIAL:  Prominent extra-axial CSF is seen along the frontoparietal   convexities near the vertex with mild flattening of the underlying brain.   There is no displacement of cortical veins. There is mild remodeling of   the inner table. Mildly prominent CSF is seen within the anterior middle   cranial fossa bilaterally    VISUALIZED SINUSES:  Normal.    VISUALIZED MASTOIDS:  Clear.    CALVARIUM:  Normal.    CAROTID FLOW VOIDS:  Present.    MISCELLANEOUS:  There is elongation of the right globe with protrusion of   the sclera posteriorly which may represent coloboma or staphyloma formation.      IMPRESSION:    No evidence of acute cerebral ischemia.    Multiple foci of white matter T2 hyperintensity which are nonspecific in   appearance. While these may be associated with migraine headaches or   microvascular disease in the appropriate clinical setting, other   possibilities including inflammatory/post inflammatory (e.g. Lyme   disease) or demyelinating etiologies should be considered.     Prominent extra-axial CSF along the frontoparietal convexities near the   vertex with mild flattening of the underlying brain. This may represent   prominent fluid within the subarachnoid space or bilateral arachnoid cysts.  Small arachnoid cysts within the middle cranial fossa bilaterally.  Elongation of the right globe as discussed above.                      Imaging Personally Reviewed:  Consultant(s) Notes Reviewed:    Care Discussed with Consultants/Other Providers:

## 2018-09-07 NOTE — H&P ADULT - HISTORY OF PRESENT ILLNESS
48 y/o F with h/o HTN, legally blind in the right eye, scoliosis presents to the Ed for palpitations, left face and arm tingling and numbness at 9:30 while watching tv. Pt states her symptoms have improved since arrival to the ED. Pt states she never had this before. Pt took her blood pressure at the time of her symptoms onset and he BP was systolic 180s. Pt denies LOC, fever, chills, slurred speech, facial droop, dizziness, chest pain, shortness of breath, headache, visual/hearing changes, weakness, dysuria, urinary/bowel incontinence or any other complaints at this time. 46 y/o F with h/o HTN, legally blind in the right eye, scoliosis presents to the Ed for palpitations, left face and arm tingling and numbness at 9:30 while watching tv. Pt states her symptoms have improved since arrival to the ED. Pt states she never had this before. Pt took her blood pressure at the time of her symptoms onset and he BP was systolic 180s. Pt denies LOC, fever, chills, slurred speech, facial droop, dizziness, chest pain, shortness of breath, headache, visual/hearing changes, weakness, dysuria, urinary/bowel incontinence or any other complaints at this time.     family HX: noncontributory to current pt's medical condition,

## 2018-09-07 NOTE — PHYSICAL THERAPY INITIAL EVALUATION ADULT - PERTINENT HX OF CURRENT PROBLEM, REHAB EVAL
Pt. is a 46 y/o female seen int he [facility] of [facility] on 09/07/18 with a dx of cerebral infarct/rule-out CVA, palpitations, Left arm & face tingling, and numbness.  PT consult request secondary to neuro event.  (-) head CT.  (-) CXR.  (-) MRI of head.

## 2018-09-07 NOTE — PROGRESS NOTE ADULT - PROBLEM SELECTOR PLAN 3
- can continue aspirin 81mg daily as per neuro recs    DISPO: Patient medically stable to be discharged home today. Spent 35 min coordinating discharge plan and counseling pt on her condition and care.

## 2018-09-07 NOTE — CONSULT NOTE ADULT - ATTENDING COMMENTS
CARDIOLOGY ATTENDING    Patient seen and examined. Agree with above. Called for stroke code r/o afib. However brain MRI shows no acute stroke, but shows non-specific hyperdensities. Recent non-invasive cardiac workup unremarkable.    Plan  -continue to monitor tele, but given no acute stroke on brain MRI she's unlikely to have occult AF as part of her presentation  -f/u neurology given abnormal brain MRI
Agree with above.  MRI brain to my eye showed multiple punctate foci of hyperintensity in the white matter-nonspecific. No evidence of acute infarction.  Impression. Suspect MRI-negative small infarct (versus TIA), perhaps right thalamic, most likely due to small vessel disease. Overall prognosis is favorable. Suggest. Continue aspirin; elective outpatient TTE; from neurovascular standpoint, cleared for discharge.

## 2018-09-07 NOTE — PHYSICAL THERAPY INITIAL EVALUATION ADULT - PATIENT PROFILE REVIEW, REHAB EVAL
ACTIVITY: out of bed with assistance; consult with Sade Fontenot RN --> pt. OK for PT as tolerated/yes

## 2018-09-07 NOTE — DISCHARGE NOTE ADULT - CARE PLAN
Principal Discharge DX:	Stroke  Goal:	Improve neurological function  Assessment and plan of treatment:	Stroke Activation- Neg imaging  Secondary Diagnosis:	Hypertension  Assessment and plan of treatment:	Follow up with your primary care doctor.  Secondary Diagnosis:	Need for prophylactic measure  Assessment and plan of treatment:	Ambulatory- Principal Discharge DX:	Stroke  Goal:	Improve neurological function  Assessment and plan of treatment:	Stroke Activation- MRI neg for acute hemorrhage.  Secondary Diagnosis:	Hypertension  Assessment and plan of treatment:	Follow up with your primary care doctor.  Secondary Diagnosis:	Need for prophylactic measure  Assessment and plan of treatment:	Ambulatory-

## 2018-09-07 NOTE — PHYSICAL THERAPY INITIAL EVALUATION ADULT - ADDITIONAL COMMENTS
Pt. left sitting at edge of stretcher post-PT in NAD with all lines/tubes intact & daughter at bedside.  PARDEEP fields.

## 2018-09-07 NOTE — DISCHARGE NOTE ADULT - PATIENT PORTAL LINK FT
You can access the [facility] Portal, offered by [facility], by registering with the following website: [email or web address]

## 2018-09-10 ENCOUNTER — TRANSCRIPTION ENCOUNTER (OUTPATIENT)
Age: 48
End: 2018-09-10

## 2018-09-11 ENCOUNTER — EMERGENCY (EMERGENCY)
Facility: HOSPITAL | Age: 48
LOS: 1 days | Discharge: HOME | End: 2018-09-11
Attending: EMERGENCY MEDICINE | Admitting: EMERGENCY MEDICINE
Payer: COMMERCIAL

## 2018-09-11 VITALS
RESPIRATION RATE: 17 BRPM | SYSTOLIC BLOOD PRESSURE: 140 MMHG | HEART RATE: 88 BPM | DIASTOLIC BLOOD PRESSURE: 80 MMHG | OXYGEN SATURATION: 98 %

## 2018-09-11 VITALS
DIASTOLIC BLOOD PRESSURE: 83 MMHG | TEMPERATURE: 98 F | RESPIRATION RATE: 18 BRPM | HEART RATE: 72 BPM | OXYGEN SATURATION: 100 % | SYSTOLIC BLOOD PRESSURE: 160 MMHG

## 2018-09-11 DIAGNOSIS — Z98.890 OTHER SPECIFIED POSTPROCEDURAL STATES: Chronic | ICD-10-CM

## 2018-09-11 LAB
ALBUMIN SERPL ELPH-MCNC: 4.5 G/DL — SIGNIFICANT CHANGE UP (ref 3.3–5)
ALP SERPL-CCNC: 66 U/L — SIGNIFICANT CHANGE UP (ref 40–120)
ALT FLD-CCNC: 19 U/L — SIGNIFICANT CHANGE UP (ref 4–33)
AST SERPL-CCNC: 25 U/L — SIGNIFICANT CHANGE UP (ref 4–32)
BASE EXCESS BLDV CALC-SCNC: 3.7 MMOL/L — SIGNIFICANT CHANGE UP
BASE EXCESS BLDV CALC-SCNC: 4 MMOL/L — SIGNIFICANT CHANGE UP
BILIRUB SERPL-MCNC: 0.4 MG/DL — SIGNIFICANT CHANGE UP (ref 0.2–1.2)
BLOOD GAS VENOUS - CREATININE: 0.59 MG/DL — SIGNIFICANT CHANGE UP (ref 0.5–1.3)
BLOOD GAS VENOUS - CREATININE: 0.61 MG/DL — SIGNIFICANT CHANGE UP (ref 0.5–1.3)
BUN SERPL-MCNC: 7 MG/DL — SIGNIFICANT CHANGE UP (ref 7–23)
CALCIUM SERPL-MCNC: 9.7 MG/DL — SIGNIFICANT CHANGE UP (ref 8.4–10.5)
CHLORIDE BLDV-SCNC: 106 MMOL/L — SIGNIFICANT CHANGE UP (ref 96–108)
CHLORIDE BLDV-SCNC: 106 MMOL/L — SIGNIFICANT CHANGE UP (ref 96–108)
CHLORIDE SERPL-SCNC: 97 MMOL/L — LOW (ref 98–107)
CO2 SERPL-SCNC: 24 MMOL/L — SIGNIFICANT CHANGE UP (ref 22–31)
CREAT SERPL-MCNC: 0.64 MG/DL — SIGNIFICANT CHANGE UP (ref 0.5–1.3)
GAS PNL BLDV: 135 MMOL/L — LOW (ref 136–146)
GAS PNL BLDV: 137 MMOL/L — SIGNIFICANT CHANGE UP (ref 136–146)
GLUCOSE BLDV-MCNC: 96 — SIGNIFICANT CHANGE UP (ref 70–99)
GLUCOSE BLDV-MCNC: 99 — SIGNIFICANT CHANGE UP (ref 70–99)
GLUCOSE SERPL-MCNC: 99 MG/DL — SIGNIFICANT CHANGE UP (ref 70–99)
HCO3 BLDV-SCNC: 25 MMOL/L — SIGNIFICANT CHANGE UP (ref 20–27)
HCO3 BLDV-SCNC: 26 MMOL/L — SIGNIFICANT CHANGE UP (ref 20–27)
HCT VFR BLD CALC: 46.7 % — HIGH (ref 34.5–45)
HCT VFR BLDV CALC: 46.3 % — HIGH (ref 34.5–45)
HCT VFR BLDV CALC: 47.5 % — HIGH (ref 34.5–45)
HGB BLD-MCNC: 15 G/DL — SIGNIFICANT CHANGE UP (ref 11.5–15.5)
HGB BLDV-MCNC: 15.1 G/DL — SIGNIFICANT CHANGE UP (ref 11.5–15.5)
HGB BLDV-MCNC: 15.5 G/DL — SIGNIFICANT CHANGE UP (ref 11.5–15.5)
LACTATE BLDV-MCNC: 1.4 MMOL/L — SIGNIFICANT CHANGE UP (ref 0.5–2)
LACTATE BLDV-MCNC: 2.5 MMOL/L — HIGH (ref 0.5–2)
MCHC RBC-ENTMCNC: 26.5 PG — LOW (ref 27–34)
MCHC RBC-ENTMCNC: 32.1 % — SIGNIFICANT CHANGE UP (ref 32–36)
MCV RBC AUTO: 82.5 FL — SIGNIFICANT CHANGE UP (ref 80–100)
NRBC # FLD: 0 — SIGNIFICANT CHANGE UP
PCO2 BLDV: 48 MMHG — SIGNIFICANT CHANGE UP (ref 41–51)
PCO2 BLDV: 54 MMHG — HIGH (ref 41–51)
PH BLDV: 7.35 PH — SIGNIFICANT CHANGE UP (ref 7.32–7.43)
PH BLDV: 7.39 PH — SIGNIFICANT CHANGE UP (ref 7.32–7.43)
PLATELET # BLD AUTO: 418 K/UL — HIGH (ref 150–400)
PMV BLD: 10.4 FL — SIGNIFICANT CHANGE UP (ref 7–13)
PO2 BLDV: 25 MMHG — LOW (ref 35–40)
PO2 BLDV: 30 MMHG — LOW (ref 35–40)
POTASSIUM BLDV-SCNC: 4.6 MMOL/L — HIGH (ref 3.4–4.5)
POTASSIUM BLDV-SCNC: 5.3 MMOL/L — HIGH (ref 3.4–4.5)
POTASSIUM SERPL-MCNC: 4.6 MMOL/L — SIGNIFICANT CHANGE UP (ref 3.5–5.3)
POTASSIUM SERPL-SCNC: 4.6 MMOL/L — SIGNIFICANT CHANGE UP (ref 3.5–5.3)
PROT SERPL-MCNC: 8.8 G/DL — HIGH (ref 6–8.3)
RBC # BLD: 5.66 M/UL — HIGH (ref 3.8–5.2)
RBC # FLD: 13.1 % — SIGNIFICANT CHANGE UP (ref 10.3–14.5)
SAO2 % BLDV: 38.9 % — LOW (ref 60–85)
SAO2 % BLDV: 53.8 % — LOW (ref 60–85)
SODIUM SERPL-SCNC: 139 MMOL/L — SIGNIFICANT CHANGE UP (ref 135–145)
TROPONIN T, HIGH SENSITIVITY: < 6 NG/L — SIGNIFICANT CHANGE UP (ref ?–14)
WBC # BLD: 8.88 K/UL — SIGNIFICANT CHANGE UP (ref 3.8–10.5)
WBC # FLD AUTO: 8.88 K/UL — SIGNIFICANT CHANGE UP (ref 3.8–10.5)

## 2018-09-11 PROCEDURE — 99285 EMERGENCY DEPT VISIT HI MDM: CPT

## 2018-09-11 PROCEDURE — 71046 X-RAY EXAM CHEST 2 VIEWS: CPT | Mod: 26

## 2018-09-11 RX ORDER — SODIUM CHLORIDE 9 MG/ML
1000 INJECTION INTRAMUSCULAR; INTRAVENOUS; SUBCUTANEOUS ONCE
Qty: 0 | Refills: 0 | Status: COMPLETED | OUTPATIENT
Start: 2018-09-11 | End: 2018-09-11

## 2018-09-11 RX ADMIN — SODIUM CHLORIDE 1000 MILLILITER(S): 9 INJECTION INTRAMUSCULAR; INTRAVENOUS; SUBCUTANEOUS at 14:55

## 2018-09-11 NOTE — ED PROVIDER NOTE - MEDICAL DECISION MAKING DETAILS
47 F with L sided numbness and chest tightness similar to prior presentation. MR at prior admission showed microvascular changes, no infarct. Was d/c'd on asa. pERc neg. VSS. Exam with sensory deficits L face and arm. No motor deficits. HEART score 2. Will check basic labs, ekg and d/w neuro

## 2018-09-11 NOTE — ED ADULT TRIAGE NOTE - CHIEF COMPLAINT QUOTE
Pt was admitted on thursday and discharged friday--pt told to follow up with cardiologist but cant get appt--pt c/o same symptoms--chest tightness and tingling down lt arm--  Pt had no symptoms on discharge but they are back at 10:30am Pt was admitted on thursday and discharged friday--pt told to follow up with cardiologist but cant get appt--pt c/o same symptoms--chest tightness and tingling down lt arm--  Pt had no symptoms on discharge but they are back at 10:30am--called Dr Cabot--no stroke code

## 2018-09-11 NOTE — ED PROVIDER NOTE - OBJECTIVE STATEMENT
47 F h/o HTN, congenital blindness, scoliosis, recent admission for code stroke when presented with L sided facial and arm numbness and chest pain, p/w similar symptoms. Was symptom free when discharged, last night started with numbness and tingling in L hand and face similar to prior. This AM at work also developed L sided chest tightness similar to prior. No SOb/diaphoresis/nausea/palpitations. No motor deficits. No neck pain. No recent illnesses. Denies leg pain or swelling, recent travel, OCP or smoking.

## 2018-09-11 NOTE — ED PROVIDER NOTE - CHIEF COMPLAINT
The patient is a 47y Female complaining of The patient is a 47y Female complaining of chest pain and numbness

## 2018-09-11 NOTE — ED ADULT NURSE NOTE - CHIEF COMPLAINT QUOTE
Pt was admitted on thursday and discharged friday--pt told to follow up with cardiologist but cant get appt--pt c/o same symptoms--chest tightness and tingling down lt arm--  Pt had no symptoms on discharge but they are back at 10:30am--called Dr Cabot--no stroke code

## 2018-09-11 NOTE — ED ADULT NURSE NOTE - OBJECTIVE STATEMENT
47 y female A+Ox3 presents to the ER with the complaint of left arm numbness. Pt had similar symptoms on Wed where she was admitted overnight with negative findings and discharged the next day. Pt followed up with her doctor yesterday with no complaints but late last night symptoms started up again. Pt states she started getting numbness on the left side of the face that radiates down the arm to the fingertips. Pt states "it feels like a tourniquet is on my arm." Denies any pain, N/V, dizziness. States pmh of htn for which she is compliant with medication for. 22 g iv placed on right AC, labs drawn and sent, will continue to monitor.

## 2018-09-11 NOTE — ED ADULT NURSE NOTE - NSIMPLEMENTINTERV_GEN_ALL_ED
Implemented All Universal Safety Interventions:  [place] to call system. Call bell, personal items and telephone within reach. Instruct patient to call for assistance. Room bathroom lighting operational. Non-slip footwear when patient is off stretcher. Physically safe environment: no spills, clutter or unnecessary equipment. Stretcher in lowest position, wheels locked, appropriate side rails in place.

## 2018-09-11 NOTE — ED PROVIDER NOTE - PROGRESS NOTE DETAILS
Cabot: Evaluated patient in [facility].  47F here several days ago with L face and LUE decreased sensation, code stroke called, all imaging negative including MRI brain, discharged with instructions for cardiology followup, now back with L facial numbness and LUE decreased sensation since last night.  She also reports high BP and would like a cardiology consult in the ED.  Will not call code stroke. Abiodun: spoke with neuro resident who is familiar with patient. Does not feel based on recent imaging and patient complaints that patient warrants inpatient workup. Can be microvascular ischemia which would be managed with ASA which patient is on. Small change of MS but can be managed outpatient. Gave patient follow up neuro in clinic

## 2018-09-12 RX ORDER — OMEPRAZOLE 10 MG/1
1 CAPSULE, DELAYED RELEASE ORAL
Qty: 0 | Refills: 0 | COMMUNITY

## 2018-09-12 RX ORDER — AMLODIPINE BESYLATE 2.5 MG/1
1 TABLET ORAL
Qty: 0 | Refills: 0 | COMMUNITY

## 2018-09-14 ENCOUNTER — APPOINTMENT (OUTPATIENT)
Dept: ORTHOPEDIC SURGERY | Facility: CLINIC | Age: 48
End: 2018-09-14
Payer: COMMERCIAL

## 2018-09-14 VITALS
DIASTOLIC BLOOD PRESSURE: 84 MMHG | BODY MASS INDEX: 38.24 KG/M2 | SYSTOLIC BLOOD PRESSURE: 140 MMHG | WEIGHT: 224 LBS | HEART RATE: 77 BPM | HEIGHT: 64 IN

## 2018-09-14 DIAGNOSIS — Z78.9 OTHER SPECIFIED HEALTH STATUS: ICD-10-CM

## 2018-09-14 DIAGNOSIS — Z87.891 PERSONAL HISTORY OF NICOTINE DEPENDENCE: ICD-10-CM

## 2018-09-14 DIAGNOSIS — M22.42 CHONDROMALACIA PATELLAE, LEFT KNEE: ICD-10-CM

## 2018-09-14 DIAGNOSIS — Z80.3 FAMILY HISTORY OF MALIGNANT NEOPLASM OF BREAST: ICD-10-CM

## 2018-09-14 PROCEDURE — 73564 X-RAY EXAM KNEE 4 OR MORE: CPT | Mod: LT

## 2018-09-14 PROCEDURE — 99203 OFFICE O/P NEW LOW 30 MIN: CPT

## 2018-09-24 PROBLEM — I10 ESSENTIAL (PRIMARY) HYPERTENSION: Chronic | Status: ACTIVE | Noted: 2018-09-07

## 2018-09-24 PROBLEM — M41.9 SCOLIOSIS, UNSPECIFIED: Chronic | Status: ACTIVE | Noted: 2018-09-07

## 2018-09-24 PROBLEM — H54.8 LEGAL BLINDNESS, AS DEFINED IN USA: Chronic | Status: ACTIVE | Noted: 2018-09-07

## 2018-10-09 ENCOUNTER — APPOINTMENT (OUTPATIENT)
Dept: NEUROLOGY | Facility: CLINIC | Age: 48
End: 2018-10-09
Payer: COMMERCIAL

## 2018-10-09 VITALS
SYSTOLIC BLOOD PRESSURE: 119 MMHG | BODY MASS INDEX: 37.56 KG/M2 | DIASTOLIC BLOOD PRESSURE: 80 MMHG | WEIGHT: 220 LBS | HEART RATE: 77 BPM | HEIGHT: 64 IN

## 2018-10-09 DIAGNOSIS — R42 DIZZINESS AND GIDDINESS: ICD-10-CM

## 2018-10-09 DIAGNOSIS — G45.9 TRANSIENT CEREBRAL ISCHEMIC ATTACK, UNSPECIFIED: ICD-10-CM

## 2018-10-09 DIAGNOSIS — H83.2X3 LABYRINTHINE DYSFUNCTION, BILATERAL: ICD-10-CM

## 2018-10-09 DIAGNOSIS — R90.89 OTHER ABNORMAL FINDINGS ON DIAGNOSTIC IMAGING OF CENTRAL NERVOUS SYSTEM: ICD-10-CM

## 2018-10-09 DIAGNOSIS — G93.0 CEREBRAL CYSTS: ICD-10-CM

## 2018-10-09 DIAGNOSIS — Z91.89 OTHER SPECIFIED PERSONAL RISK FACTORS, NOT ELSEWHERE CLASSIFIED: ICD-10-CM

## 2018-10-09 PROCEDURE — 99205 OFFICE O/P NEW HI 60 MIN: CPT

## 2018-10-11 PROBLEM — G45.9 TRANSIENT ISCHEMIC ATTACK: Status: ACTIVE | Noted: 2018-10-11

## 2018-10-11 PROBLEM — Z91.89 STROKE RISK: Status: ACTIVE | Noted: 2018-10-11

## 2018-10-12 ENCOUNTER — OTHER (OUTPATIENT)
Age: 48
End: 2018-10-12

## 2019-01-25 ENCOUNTER — APPOINTMENT (OUTPATIENT)
Dept: OTOLARYNGOLOGY | Facility: CLINIC | Age: 49
End: 2019-01-25

## 2019-03-14 ENCOUNTER — OTHER (OUTPATIENT)
Age: 49
End: 2019-03-14

## 2019-03-27 ENCOUNTER — APPOINTMENT (OUTPATIENT)
Dept: NEPHROLOGY | Facility: CLINIC | Age: 49
End: 2019-03-27

## 2019-04-23 ENCOUNTER — OUTPATIENT (OUTPATIENT)
Dept: OUTPATIENT SERVICES | Facility: HOSPITAL | Age: 49
LOS: 1 days | End: 2019-04-23
Payer: COMMERCIAL

## 2019-04-23 ENCOUNTER — APPOINTMENT (OUTPATIENT)
Dept: MRI IMAGING | Facility: IMAGING CENTER | Age: 49
End: 2019-04-23
Payer: COMMERCIAL

## 2019-04-23 DIAGNOSIS — G93.0 CEREBRAL CYSTS: ICD-10-CM

## 2019-04-23 DIAGNOSIS — Z98.890 OTHER SPECIFIED POSTPROCEDURAL STATES: Chronic | ICD-10-CM

## 2019-04-23 DIAGNOSIS — R90.89 OTHER ABNORMAL FINDINGS ON DIAGNOSTIC IMAGING OF CENTRAL NERVOUS SYSTEM: ICD-10-CM

## 2019-04-23 PROCEDURE — 70551 MRI BRAIN STEM W/O DYE: CPT

## 2019-04-23 PROCEDURE — 70551 MRI BRAIN STEM W/O DYE: CPT | Mod: 26

## 2019-06-02 ENCOUNTER — TRANSCRIPTION ENCOUNTER (OUTPATIENT)
Age: 49
End: 2019-06-02

## 2020-02-24 ENCOUNTER — APPOINTMENT (OUTPATIENT)
Dept: ORTHOPEDIC SURGERY | Facility: CLINIC | Age: 50
End: 2020-02-24
Payer: COMMERCIAL

## 2020-02-24 VITALS
HEIGHT: 64 IN | SYSTOLIC BLOOD PRESSURE: 128 MMHG | HEART RATE: 63 BPM | WEIGHT: 209 LBS | BODY MASS INDEX: 35.68 KG/M2 | DIASTOLIC BLOOD PRESSURE: 84 MMHG

## 2020-02-24 PROCEDURE — 72082 X-RAY EXAM ENTIRE SPI 2/3 VW: CPT

## 2020-02-24 PROCEDURE — 99214 OFFICE O/P EST MOD 30 MIN: CPT

## 2021-07-19 ENCOUNTER — APPOINTMENT (OUTPATIENT)
Dept: DERMATOLOGY | Facility: CLINIC | Age: 51
End: 2021-07-19

## 2022-04-04 ENCOUNTER — APPOINTMENT (OUTPATIENT)
Dept: PHYSICAL MEDICINE AND REHAB | Facility: CLINIC | Age: 52
End: 2022-04-04
Payer: COMMERCIAL

## 2022-04-04 VITALS — SYSTOLIC BLOOD PRESSURE: 120 MMHG | DIASTOLIC BLOOD PRESSURE: 81 MMHG | HEART RATE: 73 BPM | OXYGEN SATURATION: 97 %

## 2022-04-04 DIAGNOSIS — G89.28 OTHER CHRONIC POSTPROCEDURAL PAIN: ICD-10-CM

## 2022-04-04 DIAGNOSIS — G89.18 OTHER ACUTE POSTPROCEDURAL PAIN: ICD-10-CM

## 2022-04-04 PROCEDURE — 99203 OFFICE O/P NEW LOW 30 MIN: CPT

## 2022-04-04 RX ORDER — CLINDAMYCIN PHOSPHATE 1 G/10ML
1 GEL TOPICAL
Qty: 30 | Refills: 0 | Status: DISCONTINUED | COMMUNITY
Start: 2017-11-27 | End: 2022-04-04

## 2022-04-04 RX ORDER — OMEPRAZOLE 20 MG/1
TABLET, DELAYED RELEASE ORAL
Refills: 0 | Status: DISCONTINUED | COMMUNITY
End: 2022-04-04

## 2022-04-04 RX ORDER — ASPIRIN 325 MG/1
TABLET, FILM COATED ORAL
Refills: 0 | Status: DISCONTINUED | COMMUNITY
End: 2022-04-04

## 2022-04-04 RX ORDER — GABAPENTIN 300 MG/1
300 CAPSULE ORAL
Qty: 30 | Refills: 0 | Status: ACTIVE | COMMUNITY
Start: 2022-04-04 | End: 1900-01-01

## 2022-04-05 ENCOUNTER — TRANSCRIPTION ENCOUNTER (OUTPATIENT)
Age: 52
End: 2022-04-05

## 2022-04-11 PROBLEM — G89.28 POST-MASTECTOMY PAIN SYNDROME: Status: ACTIVE | Noted: 2022-04-04

## 2022-04-11 NOTE — ASSESSMENT
[FreeTextEntry1] : 50 yo female with post mastectomy pain, cording \par pain- gabapentin 300 mg qhs, med ed provided\par cording - Patient would benefit from breast cancer physical therapy to increase range of motion of shoulder musculature, strengthen shoulder and scapular stabilizers. Stretch pec lateral chest wall muscles. She would also benefit from myofascial release of axillary cording and mobilization of breast incisions. \par

## 2022-04-11 NOTE — PHYSICAL EXAM
[FreeTextEntry1] : General Appearance: Well developed, well nourished, in no acute distress.\par Skin: Inspection of the skin reveals no redness. \par HEENT: The sclerae were anicteric and conjunctivae were pink and moist. \par Chest: Normal chest expansion. Breast incisions are clean, dry and intact. No seromas or neuromas are noted. \par Axilla: No masses are noted. + axillary cording is noted. + swelling lateral chest wall \par Abdomen: Soft and non-tender with normal bowel sounds.\par Musculoskeletal: Normal range of motion of bilateral shoulders. Negative impingement tests.\par Extremities: No edema is noted in bilateral upper extremities. \par Neurologic: The patient has normal muscle strength in bilateral upper and lower extremities. Cranial nerves are intact. No expressive or receptive aphasia. No dysarthria. Sensation is intact. Gait is steady.\par

## 2022-04-11 NOTE — HISTORY OF PRESENT ILLNESS
[FreeTextEntry1] : 52 yo female with h/o left breast ca stage 1  3/10/22 double mastectomy\par has expanders, will be having tissue reconstruction \par pending medical oncology plan\par + pain in under tissue expanders\par + cording LUE \par No swelling, some burning pain in the left axilla

## 2022-05-02 ENCOUNTER — APPOINTMENT (OUTPATIENT)
Dept: PHYSICAL MEDICINE AND REHAB | Facility: CLINIC | Age: 52
End: 2022-05-02

## 2022-08-08 ENCOUNTER — APPOINTMENT (OUTPATIENT)
Dept: PLASTIC SURGERY | Facility: CLINIC | Age: 52
End: 2022-08-08

## 2022-08-08 ENCOUNTER — RESULT REVIEW (OUTPATIENT)
Age: 52
End: 2022-08-08

## 2022-08-08 VITALS
DIASTOLIC BLOOD PRESSURE: 86 MMHG | HEIGHT: 64 IN | WEIGHT: 209 LBS | TEMPERATURE: 97.9 F | SYSTOLIC BLOOD PRESSURE: 138 MMHG | HEART RATE: 68 BPM | OXYGEN SATURATION: 99 % | BODY MASS INDEX: 35.68 KG/M2

## 2022-08-08 DIAGNOSIS — Z90.13 ACQUIRED ABSENCE OF BILATERAL BREASTS AND NIPPLES: ICD-10-CM

## 2022-08-08 PROCEDURE — 99205 OFFICE O/P NEW HI 60 MIN: CPT

## 2022-08-08 RX ORDER — METHOCARBAMOL 500 MG/1
500 TABLET, FILM COATED ORAL
Qty: 30 | Refills: 0 | Status: COMPLETED | COMMUNITY
Start: 2022-03-10

## 2022-08-08 RX ORDER — CEFADROXIL 500 MG/1
500 CAPSULE ORAL
Qty: 14 | Refills: 0 | Status: COMPLETED | COMMUNITY
Start: 2022-03-21

## 2022-08-08 RX ORDER — SILVER SULFADIAZINE 10 G/1000G
1 CREAM TOPICAL
Qty: 50 | Refills: 0 | Status: COMPLETED | COMMUNITY
Start: 2022-03-18

## 2022-08-08 RX ORDER — NAPROXEN 500 MG/1
500 TABLET ORAL
Qty: 14 | Refills: 0 | Status: COMPLETED | COMMUNITY
Start: 2022-05-29

## 2022-08-08 RX ORDER — OXYCODONE AND ACETAMINOPHEN 5; 325 MG/1; MG/1
5-325 TABLET ORAL
Qty: 20 | Refills: 0 | Status: COMPLETED | COMMUNITY
Start: 2022-03-10

## 2022-08-08 RX ORDER — TAMOXIFEN CITRATE 20 MG/1
20 TABLET, FILM COATED ORAL
Qty: 90 | Refills: 0 | Status: ACTIVE | COMMUNITY
Start: 2022-05-11

## 2022-08-12 ENCOUNTER — OUTPATIENT (OUTPATIENT)
Dept: OUTPATIENT SERVICES | Facility: HOSPITAL | Age: 52
LOS: 1 days | End: 2022-08-12
Payer: COMMERCIAL

## 2022-08-12 ENCOUNTER — APPOINTMENT (OUTPATIENT)
Dept: CT IMAGING | Facility: IMAGING CENTER | Age: 52
End: 2022-08-12

## 2022-08-12 DIAGNOSIS — Z98.890 OTHER SPECIFIED POSTPROCEDURAL STATES: Chronic | ICD-10-CM

## 2022-08-12 DIAGNOSIS — Z90.13 ACQUIRED ABSENCE OF BILATERAL BREASTS AND NIPPLES: ICD-10-CM

## 2022-08-12 PROCEDURE — 74174 CTA ABD&PLVS W/CONTRAST: CPT

## 2022-08-12 PROCEDURE — 74174 CTA ABD&PLVS W/CONTRAST: CPT | Mod: 26

## 2023-11-13 ENCOUNTER — NON-APPOINTMENT (OUTPATIENT)
Age: 53
End: 2023-11-13

## 2023-11-13 ENCOUNTER — APPOINTMENT (OUTPATIENT)
Dept: ORTHOPEDIC SURGERY | Facility: CLINIC | Age: 53
End: 2023-11-13
Payer: COMMERCIAL

## 2023-11-13 VITALS
DIASTOLIC BLOOD PRESSURE: 95 MMHG | HEIGHT: 65 IN | WEIGHT: 215 LBS | BODY MASS INDEX: 35.82 KG/M2 | SYSTOLIC BLOOD PRESSURE: 148 MMHG | HEART RATE: 60 BPM

## 2023-11-13 DIAGNOSIS — G89.29 DORSALGIA, UNSPECIFIED: ICD-10-CM

## 2023-11-13 DIAGNOSIS — M54.9 DORSALGIA, UNSPECIFIED: ICD-10-CM

## 2023-11-13 PROCEDURE — 99204 OFFICE O/P NEW MOD 45 MIN: CPT

## 2023-11-13 PROCEDURE — 72082 X-RAY EXAM ENTIRE SPI 2/3 VW: CPT

## 2023-11-13 RX ORDER — DICLOFENAC SODIUM 50 MG/1
50 TABLET, DELAYED RELEASE ORAL
Qty: 30 | Refills: 2 | Status: ACTIVE | COMMUNITY
Start: 2023-11-13 | End: 1900-01-01

## 2023-11-13 RX ORDER — METHOCARBAMOL 500 MG/1
500 TABLET, FILM COATED ORAL 3 TIMES DAILY
Qty: 30 | Refills: 0 | Status: ACTIVE | COMMUNITY
Start: 2023-11-13 | End: 1900-01-01

## 2023-11-25 ENCOUNTER — OUTPATIENT (OUTPATIENT)
Dept: OUTPATIENT SERVICES | Facility: HOSPITAL | Age: 53
LOS: 1 days | End: 2023-11-25
Payer: COMMERCIAL

## 2023-11-25 ENCOUNTER — APPOINTMENT (OUTPATIENT)
Dept: MRI IMAGING | Facility: IMAGING CENTER | Age: 53
End: 2023-11-25
Payer: COMMERCIAL

## 2023-11-25 DIAGNOSIS — M41.50 OTHER SECONDARY SCOLIOSIS, SITE UNSPECIFIED: ICD-10-CM

## 2023-11-25 DIAGNOSIS — Z98.890 OTHER SPECIFIED POSTPROCEDURAL STATES: Chronic | ICD-10-CM

## 2023-11-25 DIAGNOSIS — M51.37 OTHER INTERVERTEBRAL DISC DEGENERATION, LUMBOSACRAL REGION: ICD-10-CM

## 2023-11-25 DIAGNOSIS — M54.9 DORSALGIA, UNSPECIFIED: ICD-10-CM

## 2023-11-25 PROCEDURE — 72148 MRI LUMBAR SPINE W/O DYE: CPT

## 2023-11-25 PROCEDURE — 72148 MRI LUMBAR SPINE W/O DYE: CPT | Mod: 26

## 2023-12-13 ENCOUNTER — APPOINTMENT (OUTPATIENT)
Dept: ORTHOPEDIC SURGERY | Facility: CLINIC | Age: 53
End: 2023-12-13
Payer: COMMERCIAL

## 2023-12-13 VITALS — SYSTOLIC BLOOD PRESSURE: 128 MMHG | HEART RATE: 71 BPM | DIASTOLIC BLOOD PRESSURE: 85 MMHG

## 2023-12-13 DIAGNOSIS — M51.37 OTHER INTERVERTEBRAL DISC DEGENERATION, LUMBOSACRAL REGION: ICD-10-CM

## 2023-12-13 DIAGNOSIS — M41.50 OTHER SECONDARY SCOLIOSIS, SITE UNSPECIFIED: ICD-10-CM

## 2023-12-13 PROCEDURE — 99214 OFFICE O/P EST MOD 30 MIN: CPT

## 2023-12-13 NOTE — HISTORY OF PRESENT ILLNESS
[8] : a maximum pain level of 8/10 [None] : No relieving factors are noted [Stable] : stable [___ yrs] : [unfilled] year(s) ago [Prolonged Sitting] : worsened by prolonged sitting [Sitting] : worsened by sitting [de-identified] : Patient is here for MRI review of the lumbar spine. Patient's pain level is 8/10. Patient c/o bilateral leg weakness and stiffness and pain in the morning. Works desk job, primarily sitting Primarily axial LBP did not take diclofenac or methocarbamal., did not start PT yet [de-identified] : justin auguste

## 2023-12-13 NOTE — DISCUSSION/SUMMARY
[Medication Risks Reviewed] : Medication risks reviewed [de-identified] : MRI lumbar spine performed previously was independent reviewed by me and findings discussed with patient.  She has large Tarlov cyst and sacral lesion with dural ectasia.  Referral to a neurosurgeon was provided for this.  Patient also has disc degeneration at L4-5 with asymmetric narrowing of left neural foramen because of the underlying scoliosis.  Loss of disc height at L5-S1 with essentially bone-on-bone apposition noted at that level.  Degenerative changes also at the L1-2 level.  The patient's back pain is likely related to degenerative changes seen at the L1-2 and primarily at the L4-5 and L5-S1 levels.  We discussed the option of surgical correction of this with spinal fusion surgery from L4-S1 versus correction of her scoliotic deformity.  The patient is not interested in any surgical interventions but would like to try a course of physical therapy.  This was prescribed previously but has been unable to go and I again recommended that for her.  A referral to nutritionist for weight loss was provided today.  She may continue use of diclofenac and methocarbamol prescribed previously on as-needed basis as well.  I will see her back in 3 months for reevaluation.  If her symptoms persist lumbar epidural steroid injections may be considered

## 2023-12-13 NOTE — PHYSICAL EXAM
[Normal] : Gait: normal [LE] : Sensory: Intact in bilateral lower extremities [0] : left ankle jerk 0 [DP] : dorsalis pedis 2+ and symmetric bilaterally [PT] : posterior tibial 2+ and symmetric bilaterally [SLR] : negative straight leg raise [Plantar Reflex Right Only] : absent on the right [Plantar Reflex Left Only] : absent on the left [DTR Reflexes Clonus Of Right Ankle (___ Beats)] : absent on the right [DTR Reflexes Clonus Of Left Ankle (___ Beats)] : absent on the left [de-identified] : The pt is awake, alert and oriented to self, place and time, is comfortable and in no acute distress. Inspection of neck, back and lower extremities bilaterally reveals no rashes or ecchymotic lesions. There is no tenderness over the cervical, thoracic or lumbar spine, or the paraspinal or upper and lower extremities musculature. There is no sacroiliac tenderness. No greater trochanteric tenderness bilaterally. No atrophy or abnormal movements noted in the upper or lower extremities. There is no swelling noted in the upper or lower extremities bilaterally. No cervical lymphadenopathy noted anteriorly. No joint laxity noted in the upper and lower extremity joints bilaterally. Hip range of motion is degrees internal rotation 30 external rotation without pain. Full range of motion of the shoulders bilaterally with no significant pain There is no groin pain with hip internal rotation and a negative JAMES test bilaterally.  [de-identified] : prominence of right thoracic and left lumbar paraspinal musculature\par  right shoulder elevation\par  deeper right flank crease [de-identified] : EXAM: [id number] - MR SPINE LUMBAR - ORDERED BY: AUDI MCKEON  PROCEDURE DATE: 11/25/2023  INTERPRETATION: Clinical indication: Back pain. Scoliosis  MRI of the lumbar spine was performed using sagittal T1-2 and STIR sequences. Axial T2 and coronal T1 weighted sequences were performed  Loss of the normal lordosis  Scoliosis is seen convex to the left.  The vertebral body height alignment appear normal.  Disc desiccation is seen involving the T12-L1, L1-2, L2-3, L3-4 and L5-S1 levels. These findings are secondary to chronic degenerative changes.  Modic type I degenerative changes are seen involving the endplates adjacent to the L1-2 and L4-5 level which is secondary to chronic degenerative change.  T12-L1: Normal  L1-2: Bilateral hypertrophic facet joint changes seen. Mild to moderate narrowing of the left neural foramen  L2-3: Normal.  L3-4: Disc bulge and bilateral hypertrophic facet joint changes seen. Mild narrowing of the spinal canal.  L4-5: Disc bulge and bilateral hypertrophic facet disease. Mild to moderate narrowing of the spinal canal. Mild to moderate narrowing of the right neural foramen and moderate to severe narrowing of the left neural foramen  L5-S1: Disc bulge is identified. No significant compromise of the spinal canal or either neural foramen  Dural ectatic changes as well as large Tarlov cysts are seen is seen involving the sacral spine region.  The conus ends at the bottom of L2 and appears normal  Evaluation of the paraspinal soft tissues appear normal.  Both SI joints appear intact.  IMPRESSION: Degenerative changes as described above.  Dural ectasia and Tarlov cysts are seen in the sacral spine region.  --- End of Report ---  LOW WAGONER MD; Attending Radiologist This document has been electronically signed. Nov 30 2023 5:08PM

## 2023-12-28 ENCOUNTER — NON-APPOINTMENT (OUTPATIENT)
Age: 53
End: 2023-12-28

## 2024-02-07 ENCOUNTER — APPOINTMENT (OUTPATIENT)
Dept: BARIATRICS/WEIGHT MGMT | Facility: CLINIC | Age: 54
End: 2024-02-07
Payer: COMMERCIAL

## 2024-02-07 VITALS — WEIGHT: 220 LBS | BODY MASS INDEX: 36.65 KG/M2 | HEIGHT: 65 IN

## 2024-02-07 PROCEDURE — 99205 OFFICE O/P NEW HI 60 MIN: CPT | Mod: 95

## 2024-02-16 NOTE — ASSESSMENT
[FreeTextEntry1] : BARIATRIC SURGERY HISTORY: none OBESITY CO-MORBIDITIES: htn ANTI-OBESITY MEDICATIONS: none  OBESITY MEDICATION SIDE EFFECTS: n/a    Plan:  Reviewed whole food high fiber, lean animal protein. highly suggest migrating to mostly plant based with history of breast ca  avoid process/packaged foods keep food journal  continue current exercise regiment, increase steps as tolerated  patient is not interested in medication at this time, will focus on lifestyle  request copy of any recent blood work labs   f/u 4-6 weeks

## 2024-02-16 NOTE — HISTORY OF PRESENT ILLNESS
[FreeTextEntry1] : This is a 53 year female  present in office today for initial weight management visit. PMH: obesity, HTN, breast ca s/p BL mastectomy,  may 9th- reconstructed breast surgery   Patient was referred to us by ortho Goals- would like to lose weight and come off bp meds    Has attempted weight loss with WW  heaviest weight 235 pounds  current weight 220 pounds   Patient work-  7-2pm   Lives with  and 17 yr old son   Her mother cooks  Breakfast: overnight oats, casi pudding, egg bake, coffee with milk Lunch: salad, leftovers  Dinner: 1/2 cup rice and beans, 3 ounces protein  pork/chicken/fish  Snack occasion in evening- cookies/muffin and coffee light ex close to 60 ounces of water  Eating out- 1x/week   Exercises 3 days a week  Spin has cyst at base of spine/scoliosis- weight training limited but does some light exercises at home  Goes to PT 2x/week- pelvic floor and core   sleep  mild rhys- no treatment required  sleeps through the night, stiff in abomdent which can keep her awake   Social: denies Alcohol, cigarette, recreational drugs

## 2024-02-24 ENCOUNTER — NON-APPOINTMENT (OUTPATIENT)
Age: 54
End: 2024-02-24

## 2024-02-29 ENCOUNTER — OUTPATIENT (OUTPATIENT)
Dept: OUTPATIENT SERVICES | Facility: HOSPITAL | Age: 54
LOS: 1 days | End: 2024-02-29
Payer: COMMERCIAL

## 2024-02-29 ENCOUNTER — APPOINTMENT (OUTPATIENT)
Dept: BARIATRICS/WEIGHT MGMT | Facility: CLINIC | Age: 54
End: 2024-02-29
Payer: COMMERCIAL

## 2024-02-29 VITALS — BODY MASS INDEX: 36.32 KG/M2 | HEIGHT: 65 IN | WEIGHT: 218 LBS

## 2024-02-29 DIAGNOSIS — E66.9 OBESITY, UNSPECIFIED: ICD-10-CM

## 2024-02-29 DIAGNOSIS — I10 ESSENTIAL (PRIMARY) HYPERTENSION: ICD-10-CM

## 2024-02-29 DIAGNOSIS — Z98.890 OTHER SPECIFIED POSTPROCEDURAL STATES: Chronic | ICD-10-CM

## 2024-02-29 PROCEDURE — G0463: CPT

## 2024-02-29 PROCEDURE — 99213 OFFICE O/P EST LOW 20 MIN: CPT | Mod: 95

## 2024-03-06 NOTE — ASSESSMENT
[FreeTextEntry1] : BARIATRIC SURGERY HISTORY: none OBESITY CO-MORBIDITIES: htn ANTI-OBESITY MEDICATIONS: none OBESITY MEDICATION SIDE EFFECTS: n/a  Plan: continue whole food high fiber, lean animal protein.  suggest migrating to mostly plant based with history of breast ca avoid process/packaged foods keep food journal return to regular exercise regiment, increase steps as tolerated patient is not interested in medication at this time, will focus on lifestyle request copy of any recent blood work labs  f/u 4-6 weeks.

## 2024-03-06 NOTE — HISTORY OF PRESENT ILLNESS
[Home] : at home, [unfilled] , at the time of the visit. [Other Location: e.g. Home (Enter Location, City,State)___] : at [unfilled] [Verbal consent obtained from patient] : the patient, [unfilled] [FreeTextEntry1] : This is a 53 year old female  being seen obesity followup visit.   Patient is currently recovering from flu  she also just returned from [place] on sunday-   Food recall today B: oatmeal  L: soup   Made some changes since our initial visit avoiding sweets  stopped snacking at night less eggs, attempting to eat less meat as well   while she was in [place] she was hiking every day  plans to start swimming when she gets better from flu

## 2024-04-01 ENCOUNTER — APPOINTMENT (OUTPATIENT)
Dept: NEUROSURGERY | Facility: CLINIC | Age: 54
End: 2024-04-01
Payer: COMMERCIAL

## 2024-04-01 ENCOUNTER — NON-APPOINTMENT (OUTPATIENT)
Age: 54
End: 2024-04-01

## 2024-04-01 VITALS
SYSTOLIC BLOOD PRESSURE: 129 MMHG | OXYGEN SATURATION: 99 % | HEART RATE: 68 BPM | TEMPERATURE: 97.5 F | DIASTOLIC BLOOD PRESSURE: 87 MMHG

## 2024-04-01 DIAGNOSIS — M54.50 LOW BACK PAIN, UNSPECIFIED: ICD-10-CM

## 2024-04-01 DIAGNOSIS — G96.191 PERINEURAL CYST: ICD-10-CM

## 2024-04-01 PROCEDURE — 99203 OFFICE O/P NEW LOW 30 MIN: CPT

## 2024-04-02 NOTE — PHYSICAL EXAM
[Oriented To Time, Place, And Person] : oriented to person, place, and time [Motor Tone] : muscle tone was normal in all four extremities [Motor Strength] : muscle strength was normal in all four extremities [] : no respiratory distress [Heart Rate And Rhythm] : heart rate was normal and rhythm regular [Straight-Leg Raise Test - Left] : straight leg raise of the left leg was negative [No Visual Abnormalities] : no visible abnormailities [Straight-Leg Raise Test - Right] : straight leg raise  of the right leg was negative [Normal] : normal [Able to toe walk] : the patient was able to toe walk [Able to heel walk] : the patient was able to heel walk [Intact] : all reflexes within normal limits bilaterally [Outer Ear] : the ears and nose were normal in appearance [Abnormal Walk] : normal gait

## 2024-04-02 NOTE — HISTORY OF PRESENT ILLNESS
[FreeTextEntry1] : lowerback painover one year [de-identified] : Ms. JASMINE MARQUEZ is a 53 year-old- woman presenting with a PMHx  Breast Cancer S/P bilateral Mastectomy 2022; No Chemo Radiation who presents for comprehensive neurosurgical evaluation of Lumbar spine. Today, the pt reports that symptoms started LBP after  1.5 years ago She reports Sharp pain in lowerback with leg weakness. She denies any radiculopathy. Pain worse in AM. She has had PT with some relief. She denies any bowel and bladder difficulty.

## 2024-04-02 NOTE — ASSESSMENT
[FreeTextEntry1] : Ms. JASMINE MARQUEZ is a 53 year- old- woman who presents with a diagnosis of Tarlov Cyst     Imaging and diagnostic workup evaluation show evidence of Tarlov Cyst   Plan: No interventions recommended     Follow up as needed.     Please see Dr. Aguirre's dictation for details. I, Dr. Jone Aguirre evaluated the patient with the nurse practitioner Hieu Schafer and established the plan of care. I discussed this patient with the nurse practitioner during the visit. I agree with the assessment and plan as written unless noted below.

## 2024-04-23 ENCOUNTER — OUTPATIENT (OUTPATIENT)
Dept: OUTPATIENT SERVICES | Facility: HOSPITAL | Age: 54
LOS: 1 days | End: 2024-04-23
Payer: COMMERCIAL

## 2024-04-23 ENCOUNTER — APPOINTMENT (OUTPATIENT)
Dept: BARIATRICS/WEIGHT MGMT | Facility: CLINIC | Age: 54
End: 2024-04-23
Payer: COMMERCIAL

## 2024-04-23 VITALS — HEIGHT: 65 IN | BODY MASS INDEX: 35.82 KG/M2 | WEIGHT: 215 LBS

## 2024-04-23 DIAGNOSIS — E66.9 OBESITY, UNSPECIFIED: ICD-10-CM

## 2024-04-23 DIAGNOSIS — G47.33 OBSTRUCTIVE SLEEP APNEA (ADULT) (PEDIATRIC): ICD-10-CM

## 2024-04-23 DIAGNOSIS — I10 ESSENTIAL (PRIMARY) HYPERTENSION: ICD-10-CM

## 2024-04-23 PROCEDURE — 99213 OFFICE O/P EST LOW 20 MIN: CPT | Mod: 95

## 2024-04-23 PROCEDURE — G0463: CPT

## 2024-04-23 NOTE — ASSESSMENT
[FreeTextEntry1] : BARIATRIC SURGERY HISTORY: none OBESITY CO-MORBIDITIES: htn ANTI-OBESITY MEDICATIONS: none OBESITY MEDICATION SIDE EFFECTS: n/a  Plan: focus on whole food high fiber, lean animal protein.  suggest migrating to mostly plant based with history of breast ca avoid process/packaged foods 60 gram of protein  keep food journal f/u with surgeon regarding exercise  patient is not interested in medication at this time, will focus on lifestyle request copy of any recent blood work labs  f/u 4-6 weeks.

## 2024-04-23 NOTE — HISTORY OF PRESENT ILLNESS
[Home] : at home, [unfilled] , at the time of the visit. [Other Location: e.g. Home (Enter Location, City,State)___] : at [unfilled] [Verbal consent obtained from patient] : the patient, [unfilled] [FreeTextEntry1] : This is a 53 year old female  being seen obesity followup visit.   Patient is currently recovering from breast reconstruction (april 8) follow up with surgeon tomorrow- will inquire about restarting any physical activity   Feeling very lethargic, sleeping most of the day   family members have been preparing food for her only eating 2 meals

## 2024-04-24 ENCOUNTER — TRANSCRIPTION ENCOUNTER (OUTPATIENT)
Age: 54
End: 2024-04-24

## 2024-04-29 DIAGNOSIS — G47.33 OBSTRUCTIVE SLEEP APNEA (ADULT) (PEDIATRIC): ICD-10-CM

## 2024-04-29 DIAGNOSIS — E66.9 OBESITY, UNSPECIFIED: ICD-10-CM

## 2024-05-28 ENCOUNTER — TRANSCRIPTION ENCOUNTER (OUTPATIENT)
Age: 54
End: 2024-05-28

## 2024-06-06 ENCOUNTER — APPOINTMENT (OUTPATIENT)
Dept: BARIATRICS/WEIGHT MGMT | Facility: CLINIC | Age: 54
End: 2024-06-06
Payer: COMMERCIAL

## 2024-06-06 VITALS — HEIGHT: 65 IN | WEIGHT: 221 LBS | BODY MASS INDEX: 36.82 KG/M2

## 2024-06-06 PROCEDURE — 99213 OFFICE O/P EST LOW 20 MIN: CPT | Mod: 95

## 2024-06-06 RX ORDER — AMLODIPINE BESYLATE 5 MG/1
5 TABLET ORAL
Qty: 90 | Refills: 0 | Status: DISCONTINUED | COMMUNITY
Start: 2017-08-04 | End: 2024-06-06

## 2024-06-07 ENCOUNTER — OUTPATIENT (OUTPATIENT)
Dept: OUTPATIENT SERVICES | Facility: HOSPITAL | Age: 54
LOS: 1 days | End: 2024-06-07
Payer: COMMERCIAL

## 2024-06-07 DIAGNOSIS — Z98.890 OTHER SPECIFIED POSTPROCEDURAL STATES: Chronic | ICD-10-CM

## 2024-06-07 DIAGNOSIS — I10 ESSENTIAL (PRIMARY) HYPERTENSION: ICD-10-CM

## 2024-06-07 PROCEDURE — G0463: CPT

## 2024-06-10 NOTE — HISTORY OF PRESENT ILLNESS
[Home] : at home, [unfilled] , at the time of the visit. [Other Location: e.g. Home (Enter Location, City,State)___] : at [unfilled] [Verbal consent obtained from patient] : the patient, [unfilled] [FreeTextEntry1] : This is a 53 year old female  being seen obesity followup visit.   Patient is currently recovering from breast reconstruction (april 8) She is cleared to exercise   Patient bought beach body program and then she pulled her back- slowly recovering but has not started exercising yet   Food recall yesterday:  B: cereal L: chicken salad  D: rice, beans veg   Reports increased fatigue did recent blood work, thyroid and iron all WNL   sleeping through the night- but not sure she is sleeping well. Needs to sleep on her back   BLLE swollen- on diuretic for short amount of time   weight is up

## 2024-06-10 NOTE — ASSESSMENT
[FreeTextEntry1] : BARIATRIC SURGERY HISTORY: none OBESITY CO-MORBIDITIES: htn ANTI-OBESITY MEDICATIONS: none OBESITY MEDICATION SIDE EFFECTS: n/a  Plan: focus on whole food high fiber, lean animal protein.  suggest migrating to mostly plant based with history of breast ca avoid process/packaged foods 60 gram of protein  keep food journal start exercising/walking- will also help with swelling  patient is not interested in medication at this time, will focus on lifestyle patient has hx of TIA, would consider wegovy   f/u 4-6 weeks.

## 2024-06-19 DIAGNOSIS — E66.9 OBESITY, UNSPECIFIED: ICD-10-CM

## 2024-06-19 DIAGNOSIS — G47.33 OBSTRUCTIVE SLEEP APNEA (ADULT) (PEDIATRIC): ICD-10-CM

## 2024-07-10 ENCOUNTER — APPOINTMENT (OUTPATIENT)
Dept: ORTHOPEDIC SURGERY | Facility: CLINIC | Age: 54
End: 2024-07-10

## 2024-07-10 VITALS
HEIGHT: 65 IN | OXYGEN SATURATION: 96 % | DIASTOLIC BLOOD PRESSURE: 95 MMHG | RESPIRATION RATE: 18 BRPM | WEIGHT: 225 LBS | HEART RATE: 69 BPM | BODY MASS INDEX: 37.49 KG/M2 | SYSTOLIC BLOOD PRESSURE: 146 MMHG

## 2024-07-10 DIAGNOSIS — M25.552 PAIN IN LEFT HIP: ICD-10-CM

## 2024-07-10 PROCEDURE — 73502 X-RAY EXAM HIP UNI 2-3 VIEWS: CPT

## 2024-07-10 PROCEDURE — 99213 OFFICE O/P EST LOW 20 MIN: CPT

## 2024-07-22 ENCOUNTER — APPOINTMENT (OUTPATIENT)
Dept: BARIATRICS/WEIGHT MGMT | Facility: CLINIC | Age: 54
End: 2024-07-22
Payer: COMMERCIAL

## 2024-07-22 VITALS — WEIGHT: 225 LBS | HEIGHT: 65 IN | BODY MASS INDEX: 37.49 KG/M2

## 2024-07-22 PROCEDURE — 99213 OFFICE O/P EST LOW 20 MIN: CPT | Mod: 95

## 2024-07-22 RX ORDER — IRBESARTAN 300 MG/1
300 TABLET ORAL
Refills: 0 | Status: ACTIVE | COMMUNITY
Start: 2024-07-22

## 2024-07-22 NOTE — ASSESSMENT
[FreeTextEntry1] : BARIATRIC SURGERY HISTORY: none OBESITY CO-MORBIDITIES: htn ANTI-OBESITY MEDICATIONS: none OBESITY MEDICATION SIDE EFFECTS: n/a  Plan: focus on whole food high fiber, lean animal protein.  suggest migrating to mostly plant based with history of breast ca avoid process/packaged foods 60 gram of protein  keep food journal continue walking, goal 7k daily  discussed starting med. due to HTN would avoid phentermine/wellbutrin, discussed starting topiramate.  patient is not interested in medication at this time, will focus on lifestyle and reassess at next visit   f/u 6 weeks   f/u 4-6 weeks.   [FreeTextEntry1] : - Low Dose CT chest for lung cancer screening.\par \par

## 2024-07-22 NOTE — HISTORY OF PRESENT ILLNESS
[Home] : at home, [unfilled] , at the time of the visit. [Other Location: e.g. Home (Enter Location, City,State)___] : at [unfilled] [Verbal consent obtained from patient] : the patient, [unfilled] [FreeTextEntry1] : This is a 53 year old female  being seen obesity followup visit.   Patient weight unchanged   typically eats 3 meals, will eat out 1 day a week  Food recall yesterday:  B: egg and cheese on bagel Snack: latte with 2 donut holes  D: rice, beans veg   she is walking more, has a 5 month puppy  2500-5k daily steps    swelling improved when she was taken off amlopdipine

## 2024-09-05 ENCOUNTER — APPOINTMENT (OUTPATIENT)
Dept: BARIATRICS/WEIGHT MGMT | Facility: CLINIC | Age: 54
End: 2024-09-05

## 2024-12-13 ENCOUNTER — DOCTOR'S OFFICE (OUTPATIENT)
Facility: LOCATION | Age: 54
Setting detail: OPHTHALMOLOGY
End: 2024-12-13
Payer: COMMERCIAL

## 2024-12-13 DIAGNOSIS — H25.13: ICD-10-CM

## 2024-12-13 DIAGNOSIS — H43.813: ICD-10-CM

## 2024-12-13 DIAGNOSIS — H15.831: ICD-10-CM

## 2024-12-13 DIAGNOSIS — H35.033: ICD-10-CM

## 2024-12-13 PROBLEM — H52.11: Status: ACTIVE | Noted: 2024-12-13

## 2024-12-13 PROCEDURE — 92004 COMPRE OPH EXAM NEW PT 1/>: CPT | Performed by: OPHTHALMOLOGY

## 2024-12-13 PROCEDURE — 76512 OPH US DX B-SCAN: CPT | Mod: 50 | Performed by: OPHTHALMOLOGY

## 2024-12-13 ASSESSMENT — VISUAL ACUITY
OS_BCVA: 20/HM
OD_BCVA: 20/25

## 2024-12-13 ASSESSMENT — CONFRONTATIONAL VISUAL FIELD TEST (CVF)
OD_FINDINGS: FULL
OS_FINDINGS: FULL

## 2025-05-06 ENCOUNTER — DOCTOR'S OFFICE (OUTPATIENT)
Facility: LOCATION | Age: 55
Setting detail: OPHTHALMOLOGY
End: 2025-05-06
Payer: COMMERCIAL

## 2025-05-06 DIAGNOSIS — H35.033: ICD-10-CM

## 2025-05-06 DIAGNOSIS — H43.813: ICD-10-CM

## 2025-05-06 DIAGNOSIS — H15.831: ICD-10-CM

## 2025-05-06 DIAGNOSIS — H25.13: ICD-10-CM

## 2025-05-06 PROCEDURE — 99213 OFFICE O/P EST LOW 20 MIN: CPT | Performed by: OPHTHALMOLOGY

## 2025-05-06 PROCEDURE — 76512 OPH US DX B-SCAN: CPT | Mod: 50 | Performed by: OPHTHALMOLOGY

## 2025-05-06 PROCEDURE — 92134 CPTRZ OPH DX IMG PST SGM RTA: CPT | Performed by: OPHTHALMOLOGY

## 2025-05-06 PROCEDURE — 92250 FUNDUS PHOTOGRAPHY W/I&R: CPT | Performed by: OPHTHALMOLOGY

## 2025-05-06 ASSESSMENT — CONFRONTATIONAL VISUAL FIELD TEST (CVF)
OS_FINDINGS: FULL
OD_FINDINGS: FULL

## 2025-05-06 ASSESSMENT — VISUAL ACUITY
OS_BCVA: 20/HM
OD_BCVA: 20/20

## 2025-05-06 ASSESSMENT — TONOMETRY
OS_IOP_MMHG: 13
OD_IOP_MMHG: 16